# Patient Record
Sex: FEMALE | Race: WHITE | Employment: OTHER | URBAN - METROPOLITAN AREA
[De-identification: names, ages, dates, MRNs, and addresses within clinical notes are randomized per-mention and may not be internally consistent; named-entity substitution may affect disease eponyms.]

---

## 2017-01-01 ENCOUNTER — NURSING HOME VISIT (OUTPATIENT)
Dept: GERIATRICS | Facility: CLINIC | Age: 72
End: 2017-01-01
Payer: COMMERCIAL

## 2017-01-01 ENCOUNTER — TELEPHONE (OUTPATIENT)
Dept: GERIATRICS | Facility: CLINIC | Age: 72
End: 2017-01-01

## 2017-01-01 ENCOUNTER — HOSPITAL LABORATORY (OUTPATIENT)
Facility: OTHER | Age: 72
End: 2017-01-01

## 2017-01-01 ENCOUNTER — APPOINTMENT (OUTPATIENT)
Dept: GENERAL RADIOLOGY | Facility: CLINIC | Age: 72
End: 2017-01-01
Attending: EMERGENCY MEDICINE
Payer: MEDICARE

## 2017-01-01 ENCOUNTER — DOCUMENTATION ONLY (OUTPATIENT)
Dept: GERIATRICS | Facility: CLINIC | Age: 72
End: 2017-01-01

## 2017-01-01 ENCOUNTER — HOSPITAL ENCOUNTER (EMERGENCY)
Facility: CLINIC | Age: 72
Discharge: HOME | End: 2017-08-24
Attending: EMERGENCY MEDICINE | Admitting: EMERGENCY MEDICINE
Payer: MEDICARE

## 2017-01-01 VITALS
SYSTOLIC BLOOD PRESSURE: 122 MMHG | WEIGHT: 138.6 LBS | TEMPERATURE: 97.6 F | RESPIRATION RATE: 18 BRPM | BODY MASS INDEX: 23.06 KG/M2 | HEART RATE: 98 BPM | OXYGEN SATURATION: 93 % | DIASTOLIC BLOOD PRESSURE: 66 MMHG

## 2017-01-01 VITALS
BODY MASS INDEX: 21.83 KG/M2 | TEMPERATURE: 96 F | DIASTOLIC BLOOD PRESSURE: 58 MMHG | SYSTOLIC BLOOD PRESSURE: 112 MMHG | HEART RATE: 83 BPM | RESPIRATION RATE: 18 BRPM | WEIGHT: 131.2 LBS | OXYGEN SATURATION: 91 %

## 2017-01-01 VITALS
DIASTOLIC BLOOD PRESSURE: 56 MMHG | TEMPERATURE: 97.1 F | WEIGHT: 136.2 LBS | OXYGEN SATURATION: 95 % | BODY MASS INDEX: 22.66 KG/M2 | RESPIRATION RATE: 20 BRPM | HEART RATE: 95 BPM | SYSTOLIC BLOOD PRESSURE: 148 MMHG

## 2017-01-01 VITALS
SYSTOLIC BLOOD PRESSURE: 100 MMHG | RESPIRATION RATE: 18 BRPM | WEIGHT: 140 LBS | TEMPERATURE: 97.6 F | OXYGEN SATURATION: 96 % | HEART RATE: 74 BPM | BODY MASS INDEX: 23.3 KG/M2 | DIASTOLIC BLOOD PRESSURE: 56 MMHG

## 2017-01-01 VITALS
SYSTOLIC BLOOD PRESSURE: 124 MMHG | RESPIRATION RATE: 18 BRPM | HEART RATE: 94 BPM | BODY MASS INDEX: 23.63 KG/M2 | TEMPERATURE: 98.1 F | WEIGHT: 142 LBS | DIASTOLIC BLOOD PRESSURE: 68 MMHG

## 2017-01-01 VITALS
WEIGHT: 141 LBS | SYSTOLIC BLOOD PRESSURE: 132 MMHG | DIASTOLIC BLOOD PRESSURE: 62 MMHG | TEMPERATURE: 98.2 F | RESPIRATION RATE: 20 BRPM | BODY MASS INDEX: 23.46 KG/M2 | OXYGEN SATURATION: 92 % | HEART RATE: 95 BPM

## 2017-01-01 VITALS
DIASTOLIC BLOOD PRESSURE: 66 MMHG | HEART RATE: 85 BPM | BODY MASS INDEX: 21.97 KG/M2 | SYSTOLIC BLOOD PRESSURE: 142 MMHG | RESPIRATION RATE: 18 BRPM | TEMPERATURE: 97.4 F | WEIGHT: 132 LBS | OXYGEN SATURATION: 95 %

## 2017-01-01 VITALS
TEMPERATURE: 97.8 F | HEART RATE: 92 BPM | DIASTOLIC BLOOD PRESSURE: 62 MMHG | SYSTOLIC BLOOD PRESSURE: 132 MMHG | OXYGEN SATURATION: 94 % | RESPIRATION RATE: 20 BRPM

## 2017-01-01 VITALS
OXYGEN SATURATION: 85 % | RESPIRATION RATE: 30 BRPM | TEMPERATURE: 98 F | SYSTOLIC BLOOD PRESSURE: 129 MMHG | DIASTOLIC BLOOD PRESSURE: 65 MMHG | HEART RATE: 127 BPM

## 2017-01-01 VITALS
BODY MASS INDEX: 22.13 KG/M2 | OXYGEN SATURATION: 93 % | HEART RATE: 88 BPM | WEIGHT: 133 LBS | RESPIRATION RATE: 18 BRPM | TEMPERATURE: 94.6 F | SYSTOLIC BLOOD PRESSURE: 139 MMHG | DIASTOLIC BLOOD PRESSURE: 66 MMHG

## 2017-01-01 VITALS
HEART RATE: 110 BPM | DIASTOLIC BLOOD PRESSURE: 120 MMHG | OXYGEN SATURATION: 95 % | SYSTOLIC BLOOD PRESSURE: 136 MMHG | RESPIRATION RATE: 18 BRPM | TEMPERATURE: 97.7 F

## 2017-01-01 VITALS
RESPIRATION RATE: 18 BRPM | HEART RATE: 83 BPM | BODY MASS INDEX: 21.83 KG/M2 | SYSTOLIC BLOOD PRESSURE: 112 MMHG | DIASTOLIC BLOOD PRESSURE: 58 MMHG | WEIGHT: 131.2 LBS | TEMPERATURE: 96 F | OXYGEN SATURATION: 92 %

## 2017-01-01 VITALS — SYSTOLIC BLOOD PRESSURE: 99 MMHG | DIASTOLIC BLOOD PRESSURE: 37 MMHG | HEART RATE: 81 BPM

## 2017-01-01 VITALS
WEIGHT: 142.2 LBS | SYSTOLIC BLOOD PRESSURE: 132 MMHG | OXYGEN SATURATION: 95 % | TEMPERATURE: 97.6 F | HEART RATE: 82 BPM | BODY MASS INDEX: 23.66 KG/M2 | RESPIRATION RATE: 18 BRPM | DIASTOLIC BLOOD PRESSURE: 64 MMHG

## 2017-01-01 VITALS
WEIGHT: 140.2 LBS | OXYGEN SATURATION: 96 % | HEART RATE: 66 BPM | TEMPERATURE: 98.5 F | SYSTOLIC BLOOD PRESSURE: 98 MMHG | BODY MASS INDEX: 23.33 KG/M2 | RESPIRATION RATE: 18 BRPM | DIASTOLIC BLOOD PRESSURE: 42 MMHG

## 2017-01-01 VITALS
HEART RATE: 82 BPM | BODY MASS INDEX: 23.66 KG/M2 | DIASTOLIC BLOOD PRESSURE: 64 MMHG | TEMPERATURE: 97.6 F | RESPIRATION RATE: 18 BRPM | OXYGEN SATURATION: 95 % | SYSTOLIC BLOOD PRESSURE: 132 MMHG | WEIGHT: 142.2 LBS

## 2017-01-01 VITALS
HEART RATE: 88 BPM | DIASTOLIC BLOOD PRESSURE: 60 MMHG | WEIGHT: 142 LBS | SYSTOLIC BLOOD PRESSURE: 122 MMHG | BODY MASS INDEX: 23.63 KG/M2 | OXYGEN SATURATION: 96 %

## 2017-01-01 DIAGNOSIS — K56.0 PARALYTIC ILEUS (H): ICD-10-CM

## 2017-01-01 DIAGNOSIS — J44.9 CHRONIC OBSTRUCTIVE PULMONARY DISEASE, UNSPECIFIED COPD TYPE (H): ICD-10-CM

## 2017-01-01 DIAGNOSIS — F41.1 GAD (GENERALIZED ANXIETY DISORDER): ICD-10-CM

## 2017-01-01 DIAGNOSIS — Z93.1 FEEDING BY G-TUBE (H): ICD-10-CM

## 2017-01-01 DIAGNOSIS — K56.7 ILEUS (H): Primary | ICD-10-CM

## 2017-01-01 DIAGNOSIS — E51.2 WERNICKE ENCEPHALOPATHY: Primary | ICD-10-CM

## 2017-01-01 DIAGNOSIS — E51.2 WERNICKE ENCEPHALOPATHY: ICD-10-CM

## 2017-01-01 DIAGNOSIS — G62.9 NEUROPATHY: ICD-10-CM

## 2017-01-01 DIAGNOSIS — B30.9 VIRAL CONJUNCTIVITIS: Primary | ICD-10-CM

## 2017-01-01 DIAGNOSIS — R62.7 FAILURE TO THRIVE IN ADULT: ICD-10-CM

## 2017-01-01 DIAGNOSIS — K56.0 PARALYTIC ILEUS (H): Primary | ICD-10-CM

## 2017-01-01 DIAGNOSIS — B35.9 DERMATOPHYTOSIS: Primary | ICD-10-CM

## 2017-01-01 DIAGNOSIS — K59.01 SLOW TRANSIT CONSTIPATION: ICD-10-CM

## 2017-01-01 DIAGNOSIS — F41.8 DEPRESSION WITH ANXIETY: ICD-10-CM

## 2017-01-01 DIAGNOSIS — E46 PROTEIN-CALORIE MALNUTRITION (H): ICD-10-CM

## 2017-01-01 DIAGNOSIS — R62.7 FAILURE TO THRIVE IN ADULT: Primary | ICD-10-CM

## 2017-01-01 DIAGNOSIS — Z93.1 FEEDING BY G-TUBE (H): Primary | ICD-10-CM

## 2017-01-01 DIAGNOSIS — J44.9 CHRONIC OBSTRUCTIVE PULMONARY DISEASE, UNSPECIFIED COPD TYPE (H): Primary | ICD-10-CM

## 2017-01-01 DIAGNOSIS — F41.9 ANXIETY: ICD-10-CM

## 2017-01-01 DIAGNOSIS — F41.8 DEPRESSION WITH ANXIETY: Primary | ICD-10-CM

## 2017-01-01 DIAGNOSIS — T85.528A DISLODGED GASTROSTOMY TUBE: ICD-10-CM

## 2017-01-01 DIAGNOSIS — R23.8 SKIN BREAKDOWN: Primary | ICD-10-CM

## 2017-01-01 DIAGNOSIS — K56.7 ILEUS (H): ICD-10-CM

## 2017-01-01 DIAGNOSIS — F33.1 MAJOR DEPRESSIVE DISORDER, RECURRENT EPISODE, MODERATE (H): ICD-10-CM

## 2017-01-01 DIAGNOSIS — R62.7 FAILURE TO THRIVE IN ADULT: Chronic | ICD-10-CM

## 2017-01-01 DIAGNOSIS — M62.81 GENERALIZED MUSCLE WEAKNESS: ICD-10-CM

## 2017-01-01 LAB
ALBUMIN UR-MCNC: NEGATIVE MG/DL
ANION GAP SERPL CALCULATED.3IONS-SCNC: 7 MMOL/L (ref 3–14)
APPEARANCE UR: CLEAR
BACTERIA SPEC CULT: NO GROWTH
BASOPHILS # BLD AUTO: 0.1 10E9/L (ref 0–0.2)
BASOPHILS NFR BLD AUTO: 0.4 %
BILIRUB UR QL STRIP: NEGATIVE
BUN SERPL-MCNC: 28 MG/DL (ref 7–30)
CALCIUM SERPL-MCNC: 9.6 MG/DL (ref 8.5–10.1)
CHLORIDE SERPL-SCNC: 96 MMOL/L (ref 94–109)
CO2 SERPL-SCNC: 34 MMOL/L (ref 20–32)
COLOR UR AUTO: YELLOW
CREAT SERPL-MCNC: 0.88 MG/DL (ref 0.52–1.04)
DIFFERENTIAL METHOD BLD: ABNORMAL
EOSINOPHIL # BLD AUTO: 0.4 10E9/L (ref 0–0.7)
EOSINOPHIL NFR BLD AUTO: 3.6 %
ERYTHROCYTE [DISTWIDTH] IN BLOOD BY AUTOMATED COUNT: 13.3 % (ref 10–15)
GFR SERPL CREATININE-BSD FRML MDRD: 63 ML/MIN/1.7M2
GLUCOSE SERPL-MCNC: 108 MG/DL (ref 70–99)
GLUCOSE UR STRIP-MCNC: NEGATIVE MG/DL
HCT VFR BLD AUTO: 38.6 % (ref 35–47)
HGB BLD-MCNC: 12.7 G/DL (ref 11.7–15.7)
HGB UR QL STRIP: NEGATIVE
IMM GRANULOCYTES # BLD: 0 10E9/L (ref 0–0.4)
IMM GRANULOCYTES NFR BLD: 0.3 %
KETONES UR STRIP-MCNC: NEGATIVE MG/DL
LEUKOCYTE ESTERASE UR QL STRIP: NEGATIVE
LYMPHOCYTES # BLD AUTO: 1 10E9/L (ref 0.8–5.3)
LYMPHOCYTES NFR BLD AUTO: 7.8 %
MCH RBC QN AUTO: 31.7 PG (ref 26.5–33)
MCHC RBC AUTO-ENTMCNC: 32.9 G/DL (ref 31.5–36.5)
MCV RBC AUTO: 96 FL (ref 78–100)
MICRO REPORT STATUS: NORMAL
MONOCYTES # BLD AUTO: 1 10E9/L (ref 0–1.3)
MONOCYTES NFR BLD AUTO: 7.8 %
MUCOUS THREADS #/AREA URNS LPF: PRESENT /LPF
NEUTROPHILS # BLD AUTO: 9.9 10E9/L (ref 1.6–8.3)
NEUTROPHILS NFR BLD AUTO: 80.1 %
NITRATE UR QL: NEGATIVE
PH UR STRIP: 6 PH (ref 5–7)
PLATELET # BLD AUTO: 240 10E9/L (ref 150–450)
POTASSIUM SERPL-SCNC: 4.3 MMOL/L (ref 3.4–5.3)
RBC # BLD AUTO: 4.01 10E12/L (ref 3.8–5.2)
RBC #/AREA URNS AUTO: 1 /HPF (ref 0–2)
SODIUM SERPL-SCNC: 137 MMOL/L (ref 133–144)
SP GR UR STRIP: 1.01 (ref 1–1.03)
SPECIMEN SOURCE: NORMAL
SQUAMOUS #/AREA URNS AUTO: <1 /HPF (ref 0–1)
URN SPEC COLLECT METH UR: ABNORMAL
UROBILINOGEN UR STRIP-MCNC: NORMAL MG/DL (ref 0–2)
VIT B1 BLD-MCNC: 158 UG/DL
VIT B12 SERPL-MCNC: 1097 PG/ML (ref 193–986)
VIT B12 SERPL-MCNC: 1415 PG/ML (ref 193–986)
WBC # BLD AUTO: 12.4 10E9/L (ref 4–11)
WBC #/AREA URNS AUTO: 1 /HPF (ref 0–2)

## 2017-01-01 PROCEDURE — 99308 SBSQ NF CARE LOW MDM 20: CPT | Performed by: NURSE PRACTITIONER

## 2017-01-01 PROCEDURE — 99207 ZZC CDG-CORRECTLY CODED, REVIEWED AND AGREE: CPT | Performed by: FAMILY MEDICINE

## 2017-01-01 PROCEDURE — 99207 ZZC CDG-CORRECTLY CODED, REVIEWED AND AGREE: CPT | Performed by: NURSE PRACTITIONER

## 2017-01-01 PROCEDURE — 99310 SBSQ NF CARE HIGH MDM 45: CPT | Performed by: FAMILY MEDICINE

## 2017-01-01 PROCEDURE — 40000986 XR KUB

## 2017-01-01 PROCEDURE — 99309 SBSQ NF CARE MODERATE MDM 30: CPT | Performed by: NURSE PRACTITIONER

## 2017-01-01 PROCEDURE — 99284 EMERGENCY DEPT VISIT MOD MDM: CPT | Mod: 25 | Performed by: EMERGENCY MEDICINE

## 2017-01-01 PROCEDURE — 43760 ZZHC CHANGE GASTROSTOMY TUBE PERC, WO IMAGING OR ENDO GUIDE: CPT | Mod: Z6 | Performed by: EMERGENCY MEDICINE

## 2017-01-01 PROCEDURE — 43760 ZZHC CHANGE GASTROSTOMY TUBE PERC, WO IMAGING OR ENDO GUIDE: CPT | Performed by: EMERGENCY MEDICINE

## 2017-01-01 PROCEDURE — 99310 SBSQ NF CARE HIGH MDM 45: CPT | Performed by: NURSE PRACTITIONER

## 2017-01-01 RX ORDER — OLOPATADINE HYDROCHLORIDE 1 MG/ML
1 SOLUTION/ DROPS OPHTHALMIC 2 TIMES DAILY
COMMUNITY

## 2017-01-01 RX ORDER — ATROPINE SULFATE 10 MG/ML
4 SOLUTION/ DROPS OPHTHALMIC EVERY 4 HOURS PRN
COMMUNITY

## 2017-01-01 RX ORDER — IPRATROPIUM BROMIDE AND ALBUTEROL SULFATE 2.5; .5 MG/3ML; MG/3ML
1 SOLUTION RESPIRATORY (INHALATION) 4 TIMES DAILY
COMMUNITY
End: 2017-01-01

## 2017-01-01 RX ORDER — BISACODYL 10 MG
10 SUPPOSITORY, RECTAL RECTAL DAILY
COMMUNITY

## 2017-01-03 NOTE — PROGRESS NOTES
[place] [facility]    Chief Complaint   Patient presents with     Nursing Home Acute       HPI:    Arlin Gonzalez is a 71 year old  (1945), who is being seen today for an episodic care visit at [facility]. Today's concern is:  Dermatophytosis  Has upcoming appointment to see infectious disease re rash on back, family is questioning whether she needs to go or not. Nursing notes rash is improved, no open areas on her back at this time.       REVIEW OF SYSTEMS:  4 point ROS including Respiratory, CV, GI and , other than that noted in the HPI,  is negative    GENERAL APPEARANCE:  Alert, in no acute distress-ongoing anxiety at baseline   CHEST/RESP:  respiratory effort normal , no respiratory distress  CV: no peripheral edema   SKIN: mild ruddiness on most of back and down to buttocks, no obvious rash/redness, no warmth, no open areas observed  PSYCH:  Alert and oriented to self and spouse , affect anxious, judgement impaired by cognitive losses.     Assessment:  Dermatophytosis  Resolved at this point with remaining ruddiness without symptoms infection. Has had x4 weeks of antibiotics, tolerated. Likely no reason for follow up with infectious disease at this time.       Plan:  1.  Ok to DC Infectious Disease consult- rash resolved-has completed x4 wks of abx.      Time 15 minutes for patient visit and review of medical records    Electronically signed by  Radha Salazar CNP   [facility]  [phone] cell

## 2017-01-10 NOTE — PROGRESS NOTES
"  [place] [facility]    Chief Complaint   Patient presents with     senior care Regulatory       HPI:    Arlin Gonzalez is a 71 year old  (1945), who is being seen today for a federally mandated E/M visit at [facility] . Today's concerns are:  Chronic obstructive pulmonary disease, unspecified COPD type (H)  -  No cough, wheezing, or SOB reported. Does not use O2      Anxiety  - very anxious most of the time  - no concern at this time.     Wernicke encephalopathy  - Reported to be 2/2 to vitamin deficiency.    - \"Continues to confabulate stories regularly and this is sometimes distressing to the resident\"    Dermatitis:  - Treated with ABX resulted in resolution of the rash.     Protein-calorie malnutrition (H) and Feeding by G-tube (H)  - Chronic, has a gastric feeding tube and receives a bolus nutrition supplement.     ALLERGIES: Penicillins; Adhesive tape; Amoxicillin; Ceftin; Cefzil; Ciprofloxacin; Doxycycline; Erythromycin; Iodine; Ivp dye; Levofloxacin; Macrobid; Metronidazole; Milk protein extract; Naprosyn; Prednisone; Shellfish allergy; Wheat bran; and Triamcinolone  PAST MEDICAL HISTORY:  has a past medical history of Chronic airway obstruction, not elsewhere classified; Endometriosis of other specified sites; Histoplasmosis; Kidney stone; Colitis (11/17/2014); Skin cancer; and Tobacco abuse (10/16/2012). She also has no past medical history of Squamous cell carcinoma (H), Malignant melanoma (H), or Basal cell carcinoma.  PAST SURGICAL HISTORY:  has past surgical history that includes cholecystectomy, laporoscopic (); breast lumpectomy, rt/lt (); wedge resection (); breast biopsy, rt/lt (11/21/2008); mastectomy, mod radical (any) (12/30/2008); hysterectomy, pap no longer indicated; Mastectomy modified radical bilateral (3/31/2010); hysterectomy, maria c; Revise reconstructed breast bilateral (10/16/2012); Esophagoscopy, gastroscopy, duodenoscopy (EGD), " combined (N/A, 9/25/2014); Esophagoscopy, gastroscopy, duodenoscopy (EGD), combined (N/A, 12/3/2014); picc insertion (Right, 12/4/2014); Esophageal motility study (12/8/14); and UGI ENDOSCOPY W PLACEMENT GASTROSTOMY TUBE PERCUT (N/A, 3/31/2016).  FAMILY HISTORY: family history includes Blood Disease in her mother; Cardiovascular in her brother; DIABETES in her father and paternal grandmother; HEART DISEASE in her brother, father, mother, and paternal grandfather; Hypertension in her brother and paternal grandfather.  SOCIAL HISTORY:  reports that she quit smoking about 2 years ago. Her smoking use included Cigarettes. She has a 20 pack-year smoking history. She has never used smokeless tobacco. She reports that she does not drink alcohol or use illicit drugs.    MEDICATIONS:  Current Outpatient Prescriptions   Medication Sig Dispense Refill     mineral oil-hydrophilic petrolatum (AQUAPHOR) Apply topically daily Apply to back       LACTOBACILLUS PO Take 1 billion CFU PO daily       EPINEPHrine 0.3 MG/0.3ML injection 2-pack Inject 0.3 mg into the muscle as needed for anaphylaxis       acetaminophen (TYLENOL) 500 MG tablet Take 2 tablets (1,000 mg) by mouth 3 times daily (Patient taking differently: Take 1,000 mg by mouth 3 times daily And 650mg by mouth every 4hrs PRN for pain) 100 tablet 0     nystatin POWD Externally apply topically 2 times daily       PROPRANOLOL HCL PO Take 10 mg by mouth 3 times daily        LORAZEPAM PO Take 2 mg by mouth 2 times daily as needed        LORAZEPAM PO Take 2 mg by mouth every 8 hours        Nutritional Supplements (ISOSOURCE 1.5 SRINIVAS) LIQD 1 Can by Enteral route 5 times daily        cyanocobalamin (VITAMIN B12) 1000 MCG/ML injection Inject 1 mL into the muscle every 28 days        Lansoprazole (PREVACID PO) Take 30 mg by mouth 2 times daily       Medications reviewed:    Case Management:  I have reviewed the care plan and MDS and do agree with the plan. Patient's desire to return to  the community is not present.  Information reviewed:  Medications, vital signs, orders, and nursing notes.    ROS:  10 point ROS of systems including Constitutional, Eyes, Respiratory, Cardiovascular, Gastroenterology, Genitourinary, Integumentary, Muscularskeletal, Psychiatric were all negative except for pertinent positives noted in my HPI.    Exam:  /56 mmHg  Pulse 74  Temp(Src) 97.6  F (36.4  C)  Resp 18  Wt 140 lb (63.504 kg)  SpO2 96%  LMP 01/01/1982  GENERAL APPEARANCE:  Alert, anxious, cooperative  ENT:  Mouth and posterior oropharynx normal, moist mucous membranes, implanted teeth  EYES:  EOM, conjunctivae, lids, pupils and irises normal  NECK:  No adenopathy,masses or thyromegaly  RESP:  respiratory effort and palpation of chest normal, no respiratory distress,   CV:  Palpation and auscultation of heart done , regular rate and rhythm, no murmur, rub, or gallop, no edema  ABDOMEN:  normal bowel sounds, soft, nontender, no hepatosplenomegaly or other masses, G tube in place.    M/S:   Gait and station abnormal bed fast and wc bound, proximal muscle weakness.  SKIN:  no rash or skin lesion noted. Absent breast b/l.   NEURO:   Cranial nerves 2-12 are normal tested and grossly at patient's baseline, no purposeful movement in upper and lower extremities  PSYCH:  oriented to name and place. Anxious mood and affect. Judgement and memory impaired.     Lab/Diagnostic data:  All labs reviewed, none recent    ASSESSMENT/PLAN  Chronic obstructive pulmonary disease, unspecified COPD type (H)  - chronic, stable, not on meds.     Anxiety  - Chronic, stable with lorazepam.   - Consider starting Buspar with Lorazepam GDR.   - On Propranolol.     Wernicke encephalopathy  - chronic and stable. On vitamins supplement  - Continue to anticipate needs. Chronic condition, ongoing decline expected.   -  Continue to provide redirection and reassurance as needed. Maintain safe living situation with goals focused on  comfort.      AFTT:  - Significant  Deficits requiring NH placement  - Requiring extensive assistance from nursing  - Up for meals only o/w spends the day resting in bed    Dermatophytosis / Multi-bacterial skin infection:  - Completed ABX, resolved.   - Has EpiPen for emergency.  - [facility] offered the patient penicillin testing with subsequent amoxicillin challenge, which Resident absolutely refused. Concerned that not much could  Be offered from Derma's point of view at this time.     RECOMMENDATIONS:  - Consider starting Buspar with Lorazepam GDR.     Orders:  - The current medical regimen is effective;  continue present plan and medications.      Total time spent with patient visit was 35 min including patient visit and review of past records.    Electronically signed by:  Vinicius Moreno MD

## 2017-01-27 PROBLEM — K56.0 PARALYTIC ILEUS (H): Status: ACTIVE | Noted: 2017-01-01

## 2017-01-27 NOTE — PROGRESS NOTES
[place] [facility]    Chief Complaint   Patient presents with     Nursing Home Acute       HPI:    Arlin Gonzalez is a 71 year old  (1945), who is being seen today for an episodic care visit at [facility] . Today's concern is:  Paralytic ileus (H)  Yesterday was noted to have sudden onset of increased abdominal pain, distension. BMs have been regular-no BM on 1/25 but both 1/24 and 1/23 BM were normal-no diarrhea, continues with Gtube feeding 250 ml 5 x per day. Flat Plate abdomen Xray on 1/25/16 noted mild colonic dilatation consistent with ileus. She was placed NPO-has had no meds and needs evaluation today for ongoing orders. She had a suppository yesterday with small amount of results.  Today she is reportedly feeling well, has had some clear liquids with no N/V, no further stools. She is not complaining of pain and is quite calm.      ALLERGIES: Penicillins; Adhesive tape; Amoxicillin; Ceftin; Cefzil; Ciprofloxacin; Doxycycline; Erythromycin; Iodine; Ivp dye; Levofloxacin; Macrobid; Metronidazole; Milk protein extract; Naprosyn; Prednisone; Shellfish allergy; Wheat bran; and Triamcinolone  Past Medical, Surgical, Family and Social History reviewed and updated in [facility].    Current Outpatient Prescriptions   Medication Sig Dispense Refill     mineral oil-hydrophilic petrolatum (AQUAPHOR) Apply topically daily Apply to back       LACTOBACILLUS PO Take 1 billion CFU PO daily       EPINEPHrine 0.3 MG/0.3ML injection 2-pack Inject 0.3 mg into the muscle as needed for anaphylaxis       acetaminophen (TYLENOL) 500 MG tablet Take 2 tablets (1,000 mg) by mouth 3 times daily (Patient taking differently: Take 1,000 mg by mouth 3 times daily And 650mg by mouth every 4hrs PRN for pain) 100 tablet 0     nystatin POWD Externally apply topically 2 times daily       PROPRANOLOL HCL PO Take 10 mg by mouth 3 times daily        LORAZEPAM PO Take 2 mg by mouth 2 times daily as needed        LORAZEPAM PO  Take 2 mg by mouth every 8 hours        Nutritional Supplements (ISOSOURCE 1.5 SRINIVAS) LIQD 1 Can by Enteral route 5 times daily        cyanocobalamin (VITAMIN B12) 1000 MCG/ML injection Inject 1 mL into the muscle every 28 days        Lansoprazole (PREVACID PO) Take 30 mg by mouth 2 times daily         Medications reconciled to facility chart and changes were made to reflect current medications as identified as above med list. Below are the changes that were made:   Medications stopped since last EPIC medication reconciliation:   There are no discontinued medications.    Medications started since last [facility] medication reconciliation:  No orders of the defined types were placed in this encounter.         REVIEW OF SYSTEMS:  Anamika complains of mild pain in the abdomen, janis with palpation. She is not uncomfortable at rest and she is sleeping easily when I first entered the room. She is alert, able to answer simple questions. She confabulates stories (which is her baseline) and today notes that she thinks she had abdominal surgery yesterday. She offers no other concerns.     PHYSICAL EXAM:  /60 mmHg  Pulse 88  Wt 142 lb (64.411 kg)  SpO2 96%  LMP 01/01/1982    GENERAL APPEARANCE:  Alert, in no acute distress  HEAD:  Normal, normocephalic, atraumatic  EYE EXAM: normal external eye, conjunctiva, lids, ANNETTE  NECK EXAM: stiff, no JVD  CHEST/RESP:  respiratory effort normal, lung sounds CTA , no respiratory distress  CV:  Rate reg, rhythm reg, no murmur, no peripheral edema   GI/ABDOMEN:  Slightly hyperactive, but normal bowel sounds x 4, firm, slightly tender to touch throughout but more so in the R LQ, no palpable masses, Gtube site CDI, looks good  M/S:   extremities normal, gait abnormal-she does not ambulate, normal muscle tone, and range of motion at baseline   SKIN EXAM: CDI, no open areas, no rash  NEUROLOGIC EXAM: Normal gross motor movement, tone and coordination. No tremor.  Cranial nerves 2-12 are normal  tested and grossly at patient's baseline  PSYCH:  Alert and oriented to self, affect anxious at baseline , judgement impaired by cognitive losses     RECENT LABS:  Reviewed in facility records     ASSESSMENT/PLAN:  Paralytic ileus (H)  She is feeling better than yesterday. Has remained NPO and no GTube feedings, including no meds x 24 hours. Clear liquids started a few hours ago, and she has tolerated these well without N/V.    ORDERS:  Repeat dulc supp now  Will get second flat plate abd tonight after supp has worked  Remain no Gtube feedings, no po meds  OK for clear liquids.   Monitor closely    1/27/2017 addendum  No further BM, no pain, no anxiety. She is actually doing quite well, per nursing report. Remarkably, she is more relaxed and more alert than is usual (and she has had NO meds for 48 hours!) She is taking fluids po. Will continue with bowel rest, the best treatment for ileus, as long as she is not uncomfortable. We will resume gtube flushes at a reduced rate today to keep gtube open and provide some extra hydration (100 ml 5x per day). In two days, unless she develops more pain, will start very small tube feedings at 50 ml 5x per day with 100 ml flushes. Will plan to reevaluate on Monday.     Total time spent with patient visit was 25 min including patient visit and review of past records   Greater than 50% of total time spent with counseling and coordinating care    Electronically signed by  Radha Salazar CNP   [facility]  [phone] cell

## 2017-01-31 NOTE — PROGRESS NOTES
[place] [facility]    Chief Complaint   Patient presents with     Nursing Home Acute       HPI:    Arlin Gonzalez is a 71 year old  (1945), who is being seen today for an episodic care visit at [facility] . Today's concern is:  Ileus (H)  No nausea, has tolerated small feedings last few days. No BM yet today, but did have Medium yesterday and diarrhea stool on the 29th. She often has diarrhea. Continues to complain of pain in the abdomen, appears unchanged from 5 days ago. She is not nauseated and has not vomited, per nursing staff. It is noted that most of her po meds have been on hold for several days and she is feeling better.        ALLERGIES: Penicillins; Adhesive tape; Amoxicillin; Ceftin; Cefzil; Ciprofloxacin; Doxycycline; Erythromycin; Iodine; Ivp dye; Levofloxacin; Macrobid; Metronidazole; Milk protein extract; Naprosyn; Prednisone; Shellfish allergy; Wheat bran; and Triamcinolone  Past Medical, Surgical, Family and Social History reviewed and updated in [facility].    Current Outpatient Prescriptions   Medication Sig Dispense Refill     ACETAMINOPHEN RE Place 650 mg rectally every 6 hours as needed for mild pain or fever       ACETAMINOPHEN PO Take 650 mg by mouth every 6 hours as needed for pain       mineral oil-hydrophilic petrolatum (AQUAPHOR) Apply topically daily Apply to back       LACTOBACILLUS PO 3 times daily        EPINEPHrine 0.3 MG/0.3ML injection 2-pack Inject 0.3 mg into the muscle as needed for anaphylaxis       nystatin POWD Externally apply topically 2 times daily       LORAZEPAM PO Take 2 mg by mouth 2 times daily as needed        Nutritional Supplements (ISOSOURCE 1.5 SRINIVAS) LIQD 1 Can by Enteral route 5 times daily        cyanocobalamin (VITAMIN B12) 1000 MCG/ML injection Inject 1 mL into the muscle every 28 days          Medications reconciled to facility chart and changes were made to reflect current medications as identified as above med list. Below are the  changes that were made:   Medications stopped since last EPIC medication reconciliation:   Medications Discontinued During This Encounter   Medication Reason     LORAZEPAM PO      PROPRANOLOL HCL PO      acetaminophen (TYLENOL) 500 MG tablet      Lansoprazole (PREVACID PO)        Medications started since last [facility] medication reconciliation:  Orders Placed This Encounter   Medications     ACETAMINOPHEN RE     Sig: Place 650 mg rectally every 6 hours as needed for mild pain or fever     ACETAMINOPHEN PO     Sig: Take 650 mg by mouth every 6 hours as needed for pain       REVIEW OF SYSTEMS:  Unobtainable secondary to cognitive impairment or aphasia, but today pt reports no new concerns.     PHYSICAL EXAM:  /64 mmHg  Pulse 82  Temp(Src) 97.6  F (36.4  C)  Resp 18  Wt 142 lb 3.2 oz (64.501 kg)  SpO2 95%  LMP 01/01/1982  GENERAL APPEARANCE:  Alert, in no acute distress-appears anxious as at baseline .   HEAD:  Normal, normocephalic, atraumatic  EYE EXAM: normal external eye, conjunctiva, lids, ANNETTE  NECK EXAM: stiff, no JVD  CHEST/RESP:  respiratory effort normal, lung sounds CTA , no respiratory distress  CV:  Rate reg, rhythm reg, no murmur, no peripheral edema   GI/ABDOMEN:  normal bowel sounds, soft, tender, no palpable masses  M/S:   extremities abnormal, gait abnormal-does not ambulate, normal muscle tone, and range of motion normal  NEUROLOGIC EXAM: Normal gross motor movement, tone and coordination. No tremor.  Cranial nerves 2-12 are normal tested and grossly at patient's baseline  PSYCH:  Alert and oriented to self, affect anxious, judgement impaired.     RECENT LABS:    Reviewed in facility records     ASSESSMENT/PLAN:  Ileus (H)  Has tolerated small feedings with no adverse effects, has had BM. Pain stable. Will gradually increase TF and regular diet. She has tolerated without meds below for several days. See med changes planned. Dose reduction likely helpful.       ORDERS:  1.  Ok to advance to  regular diet as tolerated.  2.  Increase feeding to 100cc 5x/day x2 days, then resume 240cc (1 can) 5x/day.  3.  Increase water flushes to 150cc water before and after feedings.  4.  DC Propranolol, scheduled Lorazepam, scheduled Tylenol PO, and Lansoprazole.  5.  Tylenol 650mg PO every 6hrs PRN for pain--or may use Tylenol suppository.  6.  May resume Lactobacillus TID.      Total time spent with patient visit was 35 min including patient visit and review of past records   Greater than 50% of total time spent with counseling and coordinating care    Electronically signed by  Radha Salazar CNP   [facility]  [phone] cell

## 2017-02-06 NOTE — PROGRESS NOTES
[place] [facility]    Chief Complaint   Patient presents with     Nursing Home Acute       HPI:    Arlin Gonzalez is a 71 year old  (1945), who is being seen today for an episodic care visit at [facility] . Today's concern is:  1. Paralytic ileus (H)    2. Chronic obstructive pulmonary disease, unspecified COPD type (H)    3. Feeding by G-tube (H)    4. Wernicke encephalopathy      After x-rays of abdomin were completed, it was found she has a paralytic ileus again.  She has been vomiting and this am he vomited her entire liquid feeding.  No evidence of a aspiration.  He GT feedings were held and we had he get only 60 cc's q 4 hours to keep it patient for the next 24 hours.  Noted that she has her Reglan dose changed.  The staff is unable to tell me when her last BM was.   was contacted once he arrived.  He wishes to remain with the DNR-Comfort care orders.  He does not wish her to be transferred to hospital.  He stated that if she is not able to resolve the ileus her at the facility, he would then prefer Hospice services.   She is anxious and does refuse to get up and out of bed.     ALLERGIES: Penicillins; Adhesive tape; Amoxicillin; Ceftin; Cefzil; Ciprofloxacin; Doxycycline; Erythromycin; Iodine; Ivp dye; Levofloxacin; Macrobid; Metronidazole; Milk protein extract; Naprosyn; Prednisone; Shellfish allergy; Wheat bran; and Triamcinolone  Past Medical, Surgical, Family and Social History reviewed and updated in [facility].    Current Outpatient Prescriptions   Medication Sig Dispense Refill     ACETAMINOPHEN RE Place 650 mg rectally every 6 hours as needed for mild pain or fever       ACETAMINOPHEN PO Take 650 mg by mouth every 6 hours as needed for pain       mineral oil-hydrophilic petrolatum (AQUAPHOR) Apply topically daily Apply to back       LACTOBACILLUS PO 3 times daily        EPINEPHrine 0.3 MG/0.3ML injection 2-pack Inject 0.3 mg into the muscle as needed for anaphylaxis        nystatin POWD Externally apply topically 2 times daily       LORAZEPAM PO Take 2 mg by mouth 2 times daily as needed        Nutritional Supplements (ISOSOURCE 1.5 SRINIVAS) LIQD 1 Can by Enteral route 5 times daily        cyanocobalamin (VITAMIN B12) 1000 MCG/ML injection Inject 1 mL into the muscle every 28 days        Medications reviewed:  Medications reconciled to facility chart and changes were made to reflect current medications as identified as above med list. Below are the changes that were made:   Medications stopped since last EPIC medication reconciliation:   There are no discontinued medications.    Medications started since last [facility] medication reconciliation:  No orders of the defined types were placed in this encounter.     REVIEW OF SYSTEMS:  Unobtainable secondary to cognitive impairment or aphasia.    Physical Exam:  /64 mmHg  Pulse 82  Temp(Src) 97.6  F (36.4  C)  Resp 18  Wt 142 lb 3.2 oz (64.501 kg)  SpO2 95%  LMP 01/01/1982  GENERAL APPEARANCE:  Alert, anxious  ENT:  normal hearing acuity, oral mucous membranes moist  NECK:  no adenopathy, no thyromegaly  RESP:  lungs clear to auscultation , no respiratory distress, expiratory wheezes  CV:  no edema, rate-normal  ABDOMEN:  bowel sounds normal, tender abdomen mild discomfort, slightly distension  M/S:   Gait and station abnormal she uses a wheelchair for all mobility needs at this time.  Digits and nails normal  SKIN:  Inspection of skin and subcutaneous tissue baseline, Palpation of skin and subcutaneous tissue baseline  NEURO:   Cranial nerves 2-12 are normal tested and grossly at patient's baseline, Examination of sensation by touch normal  PSYCH:  insight and judgement impaired, memory impaired     Recent Labs:    Hospital Laboratory on 02/03/2017   Component Date Value Ref Range Status     Specimen Description 02/03/2017 Unspecified Urine   Final     Culture Micro 02/03/2017 No growth   Final     Micro Report Status 02/03/2017 FINAL  02/05/2017   Final         Assessment/Plan:  1. Paralytic ileus (H)    2. Chronic obstructive pulmonary disease, unspecified COPD type (H)    3. Feeding by G-tube (H)    4. Wernicke encephalopathy      Orders:  1.  X-ray of Chest, abdomen.  Obtained at U/A with conditional U/C  2.  Keep her NPO for 24 hours then start feeding at 1/2 volume  3.  Restart Reglan 10 mg po TID  4.  Compazine Suppository 25 mg po BID for three days.      Electronically signed by  Tosin Soriano CNP

## 2017-02-11 NOTE — PROGRESS NOTES
[place] [facility]    Chief Complaint   Patient presents with     Nursing Home Acute       HPI:    Arlin Gonzalez is a 71 year old  (1945), who is being seen today for an episodic care visit at [facility] . Today's concern is:  1. Paralytic ileus (H)      The patient has had a struggle with an ileus this week.  She has had a BM, her Reglan was restarted, she was given an enema.  She is now able to take her whole feeding again but did stress to nursing that if she does have a BM every other day she will need to have the staff begin her bowel regimen.      ALLERGIES: Penicillins; Adhesive tape; Amoxicillin; Ceftin; Cefzil; Ciprofloxacin; Doxycycline; Erythromycin; Iodine; Ivp dye; Levofloxacin; Macrobid; Metronidazole; Milk protein extract; Naprosyn; Prednisone; Shellfish allergy; Wheat bran; and Triamcinolone  Past Medical, Surgical, Family and Social History reviewed and updated in [facility].    Current Outpatient Prescriptions   Medication Sig Dispense Refill     ACETAMINOPHEN RE Place 650 mg rectally every 6 hours as needed for mild pain or fever       ACETAMINOPHEN PO Take 650 mg by mouth every 6 hours as needed for pain       mineral oil-hydrophilic petrolatum (AQUAPHOR) Apply topically daily Apply to back       LACTOBACILLUS PO 3 times daily        EPINEPHrine 0.3 MG/0.3ML injection 2-pack Inject 0.3 mg into the muscle as needed for anaphylaxis       nystatin POWD Externally apply topically 2 times daily       LORAZEPAM PO Take 2 mg by mouth 2 times daily as needed        Nutritional Supplements (ISOSOURCE 1.5 SRINIVAS) LIQD 1 Can by Enteral route 5 times daily        cyanocobalamin (VITAMIN B12) 1000 MCG/ML injection Inject 1 mL into the muscle every 28 days        Medications reviewed:  Medications reconciled to facility chart and changes were made to reflect current medications as identified as above med list. Below are the changes that were made:   Medications stopped since last EPIC  medication reconciliation:   There are no discontinued medications.    Medications started since last [facility] medication reconciliation:  No orders of the defined types were placed in this encounter.     REVIEW OF SYSTEMS:  4 point ROS including Respiratory, CV, GI and , other than that noted in the HPI,  is negative    Physical Exam:  [facility] 01/01/1982  GENERAL APPEARANCE:  Alert, in no distress  RESP:  lungs clear to auscultation , no respiratory distress  CV:  no edema  ABDOMEN:  She has bowel sounds in all quads.  There is some tenderness over the entire abdomen.  She may need additional bowel medications  to keep her  bowel moving.  M/S:   Gait and station abnormal she is none ambulatory, uses a wheelchair for all needs.  Digits and nails normal  SKIN:  Inspection of skin and subcutaneous tissue baseline, Palpation of skin and subcutaneous tissue baseline  PSYCH:  insight and judgement impaired, memory impaired     Recent Labs:  Were reviewed today from the NH chart.    Assessment/Plan:  1. Paralytic ileus (H)      Orders:  1.  Continue with current plan of care.    Electronically signed by  LYLE Roger CNP

## 2017-02-14 NOTE — PROGRESS NOTES
[facility] [facility]    Chief Complaint   Patient presents with     Nursing Home Acute       HPI:    Arlin Gonzalez is a 71 year old  (1945), who is being seen today for an episodic care visit at [facility] . Today's concern is:  Paralytic ileus (H)  Constipation  Arlin complains of pain with tube feeding today. Had BM yesterday after suppository. On reglan. She is continuing to have periodic complaints of pain/bloating.        ALLERGIES: Penicillins; Adhesive tape; Amoxicillin; Ceftin; Cefzil; Ciprofloxacin; Doxycycline; Erythromycin; Iodine; Ivp dye [contrast dye]; Levofloxacin; Macrobid [nitrofurantoin]; Metronidazole; Milk protein extract; Naprosyn [naproxen]; Prednisone; Shellfish allergy; Wheat bran; and Triamcinolone  Past Medical, Surgical, Family and Social History reviewed and updated in [facility].    Current Outpatient Prescriptions   Medication Sig Dispense Refill     ACETAMINOPHEN RE Place 650 mg rectally every 6 hours as needed for mild pain or fever       ACETAMINOPHEN PO Take 650 mg by mouth every 6 hours as needed for pain       mineral oil-hydrophilic petrolatum (AQUAPHOR) Apply topically daily Apply to back       LACTOBACILLUS PO 3 times daily        EPINEPHrine 0.3 MG/0.3ML injection 2-pack Inject 0.3 mg into the muscle as needed for anaphylaxis       nystatin POWD Externally apply topically 2 times daily       LORAZEPAM PO Take 2 mg by mouth 2 times daily as needed        Nutritional Supplements (ISOSOURCE 1.5 SRINIVAS) LIQD 1 Can by Enteral route 5 times daily        cyanocobalamin (VITAMIN B12) 1000 MCG/ML injection Inject 1 mL into the muscle every 28 days          Medications reconciled to facility chart and changes were made to reflect current medications as identified as above med list. Below are the changes that were made:   Medications stopped since last EPIC medication reconciliation:   There are no discontinued medications.    Medications started since last EPIC  medication reconciliation:  No orders of the defined types were placed in this encounter.           REVIEW OF SYSTEMS:  4 point ROS including Respiratory, CV, GI and , other than that noted in the HPI,  is negative    PHYSICAL EXAM:  /62  Pulse 95  Temp 98.2  F (36.8  C)  Resp 20  Wt 141 lb (64 kg)  LMP 01/01/1982  SpO2 92%  BMI 23.46 kg/m2  GENERAL APPEARANCE:  Alert, in moderate distress  HEAD:  Normal, normocephalic, atraumatic  EYE EXAM: normal external eye, conjunctiva, lids, ANNETTE  NECK EXAM: stiff, no JVD  CHEST/RESP:  respiratory effort normal, lung sounds CTA , no respiratory distress  CV:  Rate reg, rhythm reg, no murmur, no peripheral edema   GI/ABDOMEN:  decreased bowel sounds, firm, tender to touch, no palpable masses  M/S:   extremities abnormal, gait abnormal-does not ambulate, normal muscle tone, and range of motion normal  NEUROLOGIC EXAM: Normal gross motor movement, tone and coordination. No tremor.  Cranial nerves 2-12 are normal tested and grossly at patient's baseline  PSYCH:  Alert and oriented to self and family with delusional behaviors, affect anxious, judgement impaired by cognitive losses     RECENT LABS:    Results for orders placed or performed in visit on 02/09/17   Basic metabolic panel   Result Value Ref Range    Sodium 137 133 - 144 mmol/L    Potassium 4.3 3.4 - 5.3 mmol/L    Chloride 96 94 - 109 mmol/L    Carbon Dioxide 34 (H) 20 - 32 mmol/L    Anion Gap 7 3 - 14 mmol/L    Glucose 108 (H) 70 - 99 mg/dL    Urea Nitrogen 28 7 - 30 mg/dL    Creatinine 0.88 0.52 - 1.04 mg/dL    GFR Estimate 63 >60 mL/min/1.7m2    GFR Estimate If Black 77 >60 mL/min/1.7m2    Calcium 9.6 8.5 - 10.1 mg/dL   CBC with platelets differential   Result Value Ref Range    WBC 12.4 (H) 4.0 - 11.0 10e9/L    RBC Count 4.01 3.8 - 5.2 10e12/L    Hemoglobin 12.7 11.7 - 15.7 g/dL    Hematocrit 38.6 35.0 - 47.0 %    MCV 96 78 - 100 fl    MCH 31.7 26.5 - 33.0 pg    MCHC 32.9 31.5 - 36.5 g/dL    RDW 13.3 10.0 -  15.0 %    Platelet Count 240 150 - 450 10e9/L    Diff Method Automated Method     % Neutrophils 80.1 %    % Lymphocytes 7.8 %    % Monocytes 7.8 %    % Eosinophils 3.6 %    % Basophils 0.4 %    % Immature Granulocytes 0.3 %    Absolute Neutrophil 9.9 (H) 1.6 - 8.3 10e9/L    Absolute Lymphocytes 1.0 0.8 - 5.3 10e9/L    Absolute Monocytes 1.0 0.0 - 1.3 10e9/L    Absolute Eosinophils 0.4 0.0 - 0.7 10e9/L    Absolute Basophils 0.1 0.0 - 0.2 10e9/L    Abs Immature Granulocytes 0.0 0 - 0.4 10e9/L   Vitamin B12   Result Value Ref Range    Vitamin B12 1415 (H) 193 - 986 pg/mL   Vitamin B1 whole blood   Result Value Ref Range    Vitamin B1 Whole Blood Level 158          ASSESSMENT/PLAN:  Paralytic ileus (H)  Constipation  Refused meds this am, but did take them later today. She tolerated her tube feeding at noon. Had BM yesterday after supp. Will continue to monitor very closely, watching BMs and pain.       ORDERS:  1. DC vit b12 injection  2. Recheck vit b12 level in 2 months on a thursday    Total time spent with patient visit was 25 min including patient visit and review of past records   Greater than 50% of total time spent with counseling and coordinating care    Electronically signed by  Radha Salazar CNP   [facility]  [phone] cell

## 2017-02-15 PROBLEM — K59.01 SLOW TRANSIT CONSTIPATION: Status: ACTIVE | Noted: 2017-01-01

## 2017-02-15 NOTE — PROGRESS NOTES
[place] [facility]    Chief Complaint   Patient presents with     Nursing Home Acute       HPI:    Arlin Gonzalez is a 71 year old  (1945), who is being seen today for an episodic care visit at [facility] . Today's concern is:  1. Paralytic ileus (H)    2. Chronic obstructive pulmonary disease, unspecified COPD type (H)    3. Anxiety    4. Wernicke encephalopathy      She is having some bowel movements but also continues to have poor bowel sounds and some generalized abdominal discomfort.  She is not vomiting, will spit up some phlegm at times.  Able to take her full volume feedings at this time.  She denies any issues with breathing and does look to be breathing comfortably.  She has some anxiety when speaking about her bowels and generalized condition.  She is able to admit she does feel anxious.  Her memory, judgement and mood are unchanged.     ALLERGIES: Penicillins; Adhesive tape; Amoxicillin; Ceftin; Cefzil; Ciprofloxacin; Doxycycline; Erythromycin; Iodine; Ivp dye [contrast dye]; Levofloxacin; Macrobid [nitrofurantoin]; Metronidazole; Milk protein extract; Naprosyn [naproxen]; Prednisone; Shellfish allergy; Wheat bran; and Triamcinolone  Past Medical, Surgical, Family and Social History reviewed and updated in [facility].    Current Outpatient Prescriptions   Medication Sig Dispense Refill     ACETAMINOPHEN RE Place 650 mg rectally every 6 hours as needed for mild pain or fever       ACETAMINOPHEN PO Take 650 mg by mouth every 6 hours as needed for pain       mineral oil-hydrophilic petrolatum (AQUAPHOR) Apply topically daily Apply to back       LACTOBACILLUS PO 3 times daily        EPINEPHrine 0.3 MG/0.3ML injection 2-pack Inject 0.3 mg into the muscle as needed for anaphylaxis       nystatin POWD Externally apply topically 2 times daily       LORAZEPAM PO Take 2 mg by mouth 2 times daily as needed        Nutritional Supplements (ISOSOURCE 1.5 SRINIVAS) LIQD 1 Can by Enteral route 5 times  daily        Medications reviewed:  Medications reconciled to facility chart and changes were made to reflect current medications as identified as above med list. Below are the changes that were made:   Medications stopped since last EPIC medication reconciliation:   There are no discontinued medications.    Medications started since last [facility] medication reconciliation:  No orders of the defined types were placed in this encounter.    REVIEW OF SYSTEMS:  4 point ROS including Respiratory, CV, GI and , other than that noted in the HPI,  is negative    Physical Exam:  /62  Pulse 92  Temp 97.8  F (36.6  C)  Resp 20  LMP 01/01/1982  SpO2 94%  GENERAL APPEARANCE:  Alert, in no distress, anxious, uncooperative  EYES:  Conjunctiva and lids normal  NECK:  soft, supply with good ROM.  RESP:  lungs clear to auscultation   CV:  no edema, rate-normal  ABDOMEN:  decreased bowel sounds in all quadrants.  slight discomfort.  No vomiting.  M/S:   Gait and station abnormal she is non ambulatory at this time and uses a wheelchair if she does get up.  Digits and nails normal  SKIN:  Inspection of skin and subcutaneous tissue baseline, Palpation of skin and subcutaneous tissue baseline  NEURO:   Cranial nerves 2-12 are normal tested and grossly at patient's baseline, Examination of sensation by touch normal  PSYCH:  insight and judgement impaired, memory impaired     Recent Labs:    Hospital Laboratory on 02/09/2017   Component Date Value Ref Range Status     Sodium 02/09/2017 137  133 - 144 mmol/L Final     Potassium 02/09/2017 4.3  3.4 - 5.3 mmol/L Final     Chloride 02/09/2017 96  94 - 109 mmol/L Final     Carbon Dioxide 02/09/2017 34* 20 - 32 mmol/L Final     Anion Gap 02/09/2017 7  3 - 14 mmol/L Final     Glucose 02/09/2017 108* 70 - 99 mg/dL Final     Urea Nitrogen 02/09/2017 28  7 - 30 mg/dL Final     Creatinine 02/09/2017 0.88  0.52 - 1.04 mg/dL Final     GFR Estimate 02/09/2017 63  >60 mL/min/1.7m2 Final    Non   American GFR Calc     GFR Estimate If Black 02/09/2017 77  >60 mL/min/1.7m2 Final    African American GFR Calc     Calcium 02/09/2017 9.6  8.5 - 10.1 mg/dL Final     WBC 02/09/2017 12.4* 4.0 - 11.0 10e9/L Final     RBC Count 02/09/2017 4.01  3.8 - 5.2 10e12/L Final     Hemoglobin 02/09/2017 12.7  11.7 - 15.7 g/dL Final     Hematocrit 02/09/2017 38.6  35.0 - 47.0 % Final     MCV 02/09/2017 96  78 - 100 fl Final     MCH 02/09/2017 31.7  26.5 - 33.0 pg Final     MCHC 02/09/2017 32.9  31.5 - 36.5 g/dL Final     RDW 02/09/2017 13.3  10.0 - 15.0 % Final     Platelet Count 02/09/2017 240  150 - 450 10e9/L Final     Diff Method 02/09/2017 Automated Method   Final     % Neutrophils 02/09/2017 80.1  % Final     % Lymphocytes 02/09/2017 7.8  % Final     % Monocytes 02/09/2017 7.8  % Final     % Eosinophils 02/09/2017 3.6  % Final     % Basophils 02/09/2017 0.4  % Final     % Immature Granulocytes 02/09/2017 0.3  % Final     Absolute Neutrophil 02/09/2017 9.9* 1.6 - 8.3 10e9/L Final     Absolute Lymphocytes 02/09/2017 1.0  0.8 - 5.3 10e9/L Final     Absolute Monocytes 02/09/2017 1.0  0.0 - 1.3 10e9/L Final     Absolute Eosinophils 02/09/2017 0.4  0.0 - 0.7 10e9/L Final     Absolute Basophils 02/09/2017 0.1  0.0 - 0.2 10e9/L Final     Abs Immature Granulocytes 02/09/2017 0.0  0 - 0.4 10e9/L Final     Vitamin B12 02/09/2017 1415* 193 - 986 pg/mL Final     Vitamin B1 Whole Blood Level 02/09/2017 158   Final    Comment: Reference range: 70 to 180  Unit: nmol/L  (Note)  INTERPRETIVE INFORMATION: Vitamin B1, Whole Blood  This assay measures the concentration of thiamine  diphosphate (TDP), the primary active form of vitamin B1.  Approximately 90 percent of vitamin B1 present in whole  blood is TDP. Thiamine and thiamine monophosphate, which  comprise the remaining 10 percent, are not measured.  Test developed and characteristics determined by [employer]. See Compliance Statement B: [email or web address]  Performed by [facility],  [address] [place] [address] [phone]  [email or web address], Honorio Bermudez MD, Lab. Director           Assessment/Plan:  1. Paralytic ileus (H)    2. Chronic obstructive pulmonary disease, unspecified COPD type (H)    3. Anxiety    4. Wernicke encephalopathy        Orders:  1.  Continue to use aggressive bowel program.  If bowel movement every other day then will need to have Dulcolax suppository and Enema.  2.  Continue on Reglan 10 mg po TID.  3.  No changes needed in breathing treatments.  4.  Continues to have issues with memory, logic and judgement.    Electronically signed by  Tosin Soriano CNP

## 2017-02-21 NOTE — PROGRESS NOTES
[place] [facility]    Chief Complaint   Patient presents with     Nursing Home Acute       HPI:    Arlin Gonzalez is a 71 year old  (1945), who is being seen today for an episodic care visit at [facility]. Today's concern is:  Depression with anxiety  CRISTINE (generalized anxiety disorder)  Arlin continues to be quite anxious, is using prn lorazepam occasionally. She is not taking routine antianxiety meds now.     Paralytic ileus (H)  Occasional constipation, occasionally refusing tube feedings. No weight loss yet.        REVIEW OF SYSTEMS:  4 point ROS including Respiratory, CV, GI and , other than that noted in the HPI,  is negative    GENERAL APPEARANCE:  Alert, in no distress, does appear anxious  CHEST/RESP:  respiratory effort normal, lung sounds CTA , no respiratory distress  CV:  Rate reg, rhythm reg, no murmur, no peripheral edema   ABD: BS + but slightly diminished.   PSYCH:  Alert and oriented to self with forgetfulness and confabulation, affect anxious much of the time, judgement impaired by cognitive losses     Assessment:  Depression with anxiety  CRISTINE (generalized anxiety disorder)  Stable, ongoing difficulty with anxiety. The current medical regimen is effective;  continue present plan and medications.     Paralytic ileus (H)  Stable, no acute findings. The current medical regimen is effective;  continue present plan and medications.        Plan:  No new orders     Time 15 minutes for patient visit and review of medical records    Electronically signed by  Radha Salazar CNP   [facility]  [phone] cell

## 2017-03-08 NOTE — PROGRESS NOTES
[facility]    Chief Complaint   Patient presents with     [facility] Regulatory       HPI:    Arlin Gonzalez is a 71 year old  (1945), who is being seen today for a federally mandated E/M visit at [facility] . Today's concerns are:   Failure to thrive in adult  Neuropathy (H)  Paralytic ileus (H)  CRISTINE (generalized anxiety disorder)  Major depressive disorder, recurrent episode, moderate (HCC)  Generalized muscle weakness  Feeding by G-tube (H)  Arlin continues to be mostly bed fast, though is encouraged to get out of bed 3 times per day. At times, she will do this. She continues to be anxious and confabulates stories but her speech is somewhat slower and more difficult to understand. She has also started to have increased spitting of oral secretions, verbalizing that she cannot swallow the saliva. She is able to swallow pills and some food. Her anxiety and confabulation actually seem to be improved since DC of scheduled anti-anxiety meds. She has significant stiffness of her neck, back, legs, arms, with some new decorticate type posturing of her feet and legs. She lies mostly in bed on her back. Bowels have been working, and no recent n/v. Flat plate of abdomen done yesterday negative for ileus.     ALLERGIES: Penicillins; Adhesive tape; Amoxicillin; Ceftin; Cefzil; Ciprofloxacin; Doxycycline; Erythromycin; Iodine; Ivp dye [contrast dye]; Levofloxacin; Macrobid [nitrofurantoin]; Metronidazole; Milk protein extract; Naprosyn [naproxen]; Prednisone; Shellfish allergy; Wheat bran; and Triamcinolone  PAST MEDICAL HISTORY:  has a past medical history of Chronic airway obstruction, not elsewhere classified; Colitis (11/17/2014); Endometriosis of other specified sites; Histoplasmosis; Kidney stone; Skin cancer; and Tobacco abuse (10/16/2012). She also has no past medical history of Basal cell carcinoma; Malignant melanoma (H); or Squamous cell carcinoma (H).  PAST SURGICAL HISTORY:   has a past surgical history that includes cholecystectomy, laporoscopic (); breast lumpectomy, rt/lt (); wedge resection (); breast biopsy, rt/lt (11/21/2008); mastectomy, mod radical (any) (12/30/2008); hysterectomy, pap no longer indicated; Mastectomy modified radical bilateral (3/31/2010); hysterectomy, maria c; Revise reconstructed breast bilateral (10/16/2012); Esophagoscopy, gastroscopy, duodenoscopy (EGD), combined (N/A, 9/25/2014); Esophagoscopy, gastroscopy, duodenoscopy (EGD), combined (N/A, 12/3/2014); picc insertion (Right, 12/4/2014); Esophageal motility study (12/8/14); and UGI ENDOSCOPY W PLACEMENT GASTROSTOMY TUBE PERCUT (N/A, 3/31/2016).  FAMILY HISTORY: family history includes Blood Disease in her mother; Cardiovascular in her brother; DIABETES in her father and paternal grandmother; HEART DISEASE in her brother, father, mother, and paternal grandfather; Hypertension in her brother and paternal grandfather.  SOCIAL HISTORY:  reports that she quit smoking about 2 years ago. Her smoking use included Cigarettes. She has a 20.00 pack-year smoking history. She has never used smokeless tobacco. She reports that she does not drink alcohol or use illicit drugs.    MEDICATIONS:  Current Outpatient Prescriptions   Medication Sig Dispense Refill     ACETAMINOPHEN RE Place 650 mg rectally every 6 hours as needed for mild pain or fever       ACETAMINOPHEN PO Take 650 mg by mouth every 6 hours as needed for pain       mineral oil-hydrophilic petrolatum (AQUAPHOR) Apply topically daily Apply to back       LACTOBACILLUS PO 3 times daily        EPINEPHrine 0.3 MG/0.3ML injection 2-pack Inject 0.3 mg into the muscle as needed for anaphylaxis       nystatin POWD Externally apply topically 2 times daily       LORAZEPAM PO Take 2 mg by mouth 2 times daily as needed        Nutritional Supplements (ISOSOURCE 1.5 SRINIVAS) LIQD 1 Can by Enteral route 5 times daily           Case Management:  I have reviewed  the care plan and MDS and do agree with the plan. Patient's desire to return to the community is present, but is not able due to care needs .  Information reviewed:  Medications, vital signs, orders, and nursing notes.    ROS:  Unobtainable secondary to cognitive impairment or aphasia.    Exam:  BP (!) 99/37  Pulse 81  LMP 01/01/1982  GENERAL APPEARANCE:  Alert, in no distress  HEAD:  Normal, normocephalic, atraumatic  EYE EXAM: normal external eye, conjunctiva, lids, ANNETTE  NECK EXAM: stiff, no JVD  CHEST/RESP:  respiratory effort normal, lung sounds CTA , no respiratory distress  CV:  Rate reg, rhythm reg, no murmur, no peripheral edema   GI/ABDOMEN:  diminished bowel sounds, firm, tender, no palpable masses   M/S:   extremities abnormal, gait abnormal-unable to ambulate, increased muscle tone, and range of motion limited by stiffness. Her toes/feet are very stiff, L foot is dorsi-flexed and R foot is pointed. Joints very stiff and she describes some discomfort when ROM is attempted. Arms are very stiff as well.   SKIN EXAM: No rash on skin, no open areas  NEUROLOGIC EXAM: Normal gross motor movement, tone and coordination. No tremor.  Cranial nerves 2-12 are normal tested and grossly at patient's baseline  PSYCH:  Alert and oriented to self and family, affect anxious, judgement impaired by cognitive impairment.     Lab/Diagnostic data:    Results for orders placed or performed in visit on 02/09/17   Basic metabolic panel   Result Value Ref Range    Sodium 137 133 - 144 mmol/L    Potassium 4.3 3.4 - 5.3 mmol/L    Chloride 96 94 - 109 mmol/L    Carbon Dioxide 34 (H) 20 - 32 mmol/L    Anion Gap 7 3 - 14 mmol/L    Glucose 108 (H) 70 - 99 mg/dL    Urea Nitrogen 28 7 - 30 mg/dL    Creatinine 0.88 0.52 - 1.04 mg/dL    GFR Estimate 63 >60 mL/min/1.7m2    GFR Estimate If Black 77 >60 mL/min/1.7m2    Calcium 9.6 8.5 - 10.1 mg/dL   CBC with platelets differential   Result Value Ref Range    WBC 12.4 (H) 4.0 - 11.0 10e9/L     RBC Count 4.01 3.8 - 5.2 10e12/L    Hemoglobin 12.7 11.7 - 15.7 g/dL    Hematocrit 38.6 35.0 - 47.0 %    MCV 96 78 - 100 fl    MCH 31.7 26.5 - 33.0 pg    MCHC 32.9 31.5 - 36.5 g/dL    RDW 13.3 10.0 - 15.0 %    Platelet Count 240 150 - 450 10e9/L    Diff Method Automated Method     % Neutrophils 80.1 %    % Lymphocytes 7.8 %    % Monocytes 7.8 %    % Eosinophils 3.6 %    % Basophils 0.4 %    % Immature Granulocytes 0.3 %    Absolute Neutrophil 9.9 (H) 1.6 - 8.3 10e9/L    Absolute Lymphocytes 1.0 0.8 - 5.3 10e9/L    Absolute Monocytes 1.0 0.0 - 1.3 10e9/L    Absolute Eosinophils 0.4 0.0 - 0.7 10e9/L    Absolute Basophils 0.1 0.0 - 0.2 10e9/L    Abs Immature Granulocytes 0.0 0 - 0.4 10e9/L   Vitamin B12   Result Value Ref Range    Vitamin B12 1415 (H) 193 - 986 pg/mL   Vitamin B1 whole blood   Result Value Ref Range    Vitamin B1 Whole Blood Level 158          ASSESSMENT/PLAN  Failure to thrive in adult  Arlin continues to lie in bed much of each day, often refusing to get out of bed. She is declining both physically and mentally.     Neuropathy (H)  Ongoing pain, poor mobility and new decorticate posturing.     Paralytic ileus (H)  Abd xray done yesterday was negative for paralytic ileus-with bowels working easily.     CRISTINE (generalized anxiety disorder)  Routine anxiety and is using only prn lorazepam. She has not had routinely scheduled lorazepam for several weeks.     Generalized muscle weakness  Weak with decorticate posturing, decreased ROM. Will add therapy as this may help with comfort/quality of life    Feeding by G-tube (H)  Stable, tolerating feedings without nausea, vomiting, no diarrhea    Orders:  1. PT/OT to eval and treat for decorticate posturing, ROM feet, ankles and neck  2. ST to eval swallowing/spitting and speech.    Total time spent with patient visit was 35 min including patient visit and review of past records.Greater than 50% of total time spent with counseling and coordinating  care.    Electronically signed by:  Radha Salazar CNP   [facility]  [phone] cell

## 2017-04-27 NOTE — PROGRESS NOTES
Chief Complaint   Patient presents with     Nursing Home Acute       HPI:    Arlin Gonzalez is a 71 year old  (1945), who is being seen today for an episodic care visit at [facility]. Today's concern is:   Skin breakdown   Around Gtube insertion site, with leaking of stomach contents.    Last 24 Fr 3 cm balloon-tipped button G-tube change 12/12/16 by Dr. Hollingsworth at [facility] in office without fluoroscopy. Note routed to [facility] for records.     Past Medical, Surgical, Family and Social History reviewed and updated.    REVIEW OF SYSTEMS:  Asleep, unable to answer simple questions today.     LABS:  Reviewed in facility records     PHYSICAL EXAM:    /68  Pulse 94  Temp 98.1  F (36.7  C)  Resp 18  Wt 142 lb (64.4 kg)  LMP 01/01/1982  BMI 23.63 kg/m2  GENERAL APPEARANCE:  Sound asleep, in no distress  HEAD:  Normal, normocephalic, atraumatic  CHEST/RESP:  respiratory effort normal , no respiratory distress  SKIN:  Inspection and Palpation of skin and subcutaneous tissue surrounding Gtube site -is reddened, excoriated and noted to have small amount of stomach contents leaking around site.    Assessment  Skin breakdown   Gtube last changed about 4 months ago, can be changed at facility.     Plan:  Will order needed supplies for routine Gtube change    1.  Change Gtube [id number]  2.  Change extension tube [id number]  3.  qshift skin care to gtube site-cleanse, apply a thin layer of zinc cream, cover with split 4x4    Total time spent with patient visit was 25 min including patient visit, review of past records and discussion with spouse   Greater than 50% of time spent in counseling and coordination of care    Radha Salazar CNP   [facility]  [phone] voice mail

## 2017-05-02 NOTE — PROGRESS NOTES
[facility]    Chief Complaint   Patient presents with     Nursing Home Acute       HPI:    Arlin Gonzalez is a 72 year old  (1945), who is being seen today for an episodic care visit at [facility] at [place]. Today's concern is:  Feeding by G-tube (H)  Ileus (H)  Protein-calorie malnutrition (H)  Wernicke encephalopathy  Patient with ongoing gtube feeding, with some oral gagging and spitting up of yellowish tinged stomach contents after bolus feedings and reports of gtube site excoriation. Recently changed to continuous tf to see if this improves. Arlin is generally alert but very confused, anxious at baseline.        ALLERGIES: Penicillins; Adhesive tape; Amoxicillin; Ceftin; Cefzil; Ciprofloxacin; Doxycycline; Erythromycin; Iodine; Ivp dye [contrast dye]; Levofloxacin; Macrobid [nitrofurantoin]; Metronidazole; Milk protein extract; Naprosyn [naproxen]; Prednisone; Shellfish allergy; Wheat bran; and Triamcinolone  Past Medical, Surgical, Family and Social History reviewed and updated in [facility].    Current Outpatient Prescriptions   Medication Sig Dispense Refill     bisacodyl (DULCOLAX) 10 MG Suppository Place 10 mg rectally daily And every 12hrs PRN       Metoclopramide HCl (REGLAN PO) Take 10 mg by mouth 3 times daily       ipratropium - albuterol 0.5 mg/2.5 mg/3 mL (DUONEB) 0.5-2.5 (3) MG/3ML neb solution Take 1 vial by nebulization 4 times daily       atropine 1 % ophthalmic solution Take 4 drops by mouth every 4 hours as needed       sodium phosphate (FLEET ENEMA) 7-19 GM/118ML rectal enema Place 1 enema rectally as needed for constipation       ACETAMINOPHEN PO Take 650 mg by mouth every 6 hours as needed for pain       mineral oil-hydrophilic petrolatum (AQUAPHOR) Apply topically daily Apply to back       LACTOBACILLUS PO 3 times daily        EPINEPHrine 0.3 MG/0.3ML injection 2-pack Inject 0.3 mg into the muscle as needed for anaphylaxis       nystatin POWD Externally apply topically  "2 times daily       Nutritional Supplements (ISOSOURCE 1.5 SRINIVAS) LIQD 1 Can by Enteral route 5 times daily          Medications reconciled to facility chart and changes were made to reflect current medications as identified as above med list. Below are the changes that were made:   Medications stopped since last EPIC medication reconciliation:   Medications Discontinued During This Encounter   Medication Reason     ACETAMINOPHEN RE      LORAZEPAM PO        Medications started since last [facility] medication reconciliation:  Orders Placed This Encounter   Medications     bisacodyl (DULCOLAX) 10 MG Suppository     Sig: Place 10 mg rectally daily And every 12hrs PRN     Metoclopramide HCl (REGLAN PO)     Sig: Take 10 mg by mouth 3 times daily     ipratropium - albuterol 0.5 mg/2.5 mg/3 mL (DUONEB) 0.5-2.5 (3) MG/3ML neb solution     Sig: Take 1 vial by nebulization 4 times daily     atropine 1 % ophthalmic solution     Sig: Take 4 drops by mouth every 4 hours as needed     sodium phosphate (FLEET ENEMA) 7-19 GM/118ML rectal enema     Sig: Place 1 enema rectally as needed for constipation         REVIEW OF SYSTEMS:  Unobtainable secondary to cognitive impairment or aphasia, but today pt reports \"I'm OK\"    PHYSICAL EXAM:  /58  Pulse 83  Temp 96  F (35.6  C)  Resp 18  Wt 131 lb 3.2 oz (59.5 kg)  LMP 01/01/1982  SpO2 91%  BMI 21.83 kg/m2  GENERAL APPEARANCE:  Alert, in no distress  HEAD:  Normal, normocephalic, atraumatic  EYE EXAM: normal external eye, conjunctiva, lids, ANNETTE  NECK EXAM: supple, no JVD  CHEST/RESP:  respiratory effort normal, lung sounds CTA , no respiratory distress  CV:  Rate reg, rhythm reg, no murmur, no peripheral edema   GI/ABDOMEN:  normal bowel sounds, soft, slightly tender to palpation, no palpable masses  M/S:   extremities abnormal, gait abnormal-does not ambulate, increased muscle tone, and range of motion decreased, decorticate posturing and contractures  SKIN EXAM: skin around gtube " with decreased excoriation, decreased odor from last week but still with some redness, small amount of drainage  NEUROLOGIC EXAM: Normal gross motor movement, tone and coordination. No tremor.  Cranial nerves 2-12 are normal tested and grossly at patient's baseline  PSYCH:  Alert and oriented to self and family, affect anxious, judgement impaired by cognitive losses     RECENT LABS:    Hospital Laboratory on 04/13/2017   Component Date Value Ref Range Status     Vitamin B12 04/13/2017 1097* 193 - 986 pg/mL Final         ASSESSMENT/PLAN:  Feeding by G-tube (H)  Ileus (H)  Protein-calorie malnutrition (H)  gtube feeding continuous seems to cause less spitting of phlegm/stomach contents and this does not impact her quality of life as she spends most of her time bedfast. She is having regular BM, sometimes loose. Xray monthly for review of chronic ileus.     Wernicke encephalopathy  Advanced memory changes with anxiety, worsening ability to interact with her surroundings. She sleeps much of the time. Usually spends most of each day in bed. Spouse is aware of and understanding of ongoing and chronic decline. The current medical regimen is effective;  continue present plan and medications. Goals of care focused on comfort and quality of life.       ORDERS:  1.  DC Lorazepam-no need for over 1 month.      Total time spent with patient visit was 25 min including patient visit, review of past records and discussion with spouse   Greater than 50% of total time spent with counseling and coordinating care    Electronically signed by  Radha Salazar CNP   [facility]  [phone] cell

## 2017-05-09 NOTE — PROGRESS NOTES
"  [facility]    Chief Complaint   Patient presents with     [facility] Regulatory       HPI:   Obtained from the patient, medical record and from the [facility] staffs.     Arlin Gonzalez is a 72 year old  (1945), who is being seen today for a federally mandated E/M visit [facility] [place]. Today's concerns are:  Wernicke encephalopathy  - Reported to be 2/2 to vitamin deficiency.    - \"Continues to confabulate stories regularly and this is sometimes distressing to the resident\"  - Reported to  Be declining in general.     Failure to thrive in adult  - wt stable.  - bed-bound  - no potential rehab.     Depression with anxiety  - very anxious most of the time  - no concern at this time      Feeding by G-tube (H)  - Gets reflex/cough, spits up yellowish sputum at times).  - Did not tolerate bolus feeding, hence on continuous feeding.     _________________________________  # Past Medical, social, family histories, medications, and allergies reviewed and updated    MEDICATIONS:  Current Outpatient Prescriptions   Medication Sig Dispense Refill     bisacodyl (DULCOLAX) 10 MG Suppository Place 10 mg rectally daily And every 12hrs PRN       Metoclopramide HCl (REGLAN PO) Take 10 mg by mouth 3 times daily       ipratropium - albuterol 0.5 mg/2.5 mg/3 mL (DUONEB) 0.5-2.5 (3) MG/3ML neb solution Take 1 vial by nebulization 4 times daily       atropine 1 % ophthalmic solution Take 4 drops by mouth every 4 hours as needed       sodium phosphate (FLEET ENEMA) 7-19 GM/118ML rectal enema Place 1 enema rectally as needed for constipation       ACETAMINOPHEN PO Take 650 mg by mouth every 6 hours as needed for pain       mineral oil-hydrophilic petrolatum (AQUAPHOR) Apply topically daily Apply to back       LACTOBACILLUS PO 3 times daily        EPINEPHrine 0.3 MG/0.3ML injection 2-pack Inject 0.3 mg into the muscle as needed for anaphylaxis       nystatin POWD Externally apply topically 2 times daily   "     Nutritional Supplements (ISOSOURCE 1.5 SRINIVAS) LIQD 1 Can by Enteral route 5 times daily        Medications reviewed: Reviewed in the chart and EHR.        Case Management:  I have reviewed the care plan and MDS and do agree with the plan. Patient's desire to return to the community is not present.  Information reviewed:  Medications, vital signs, orders, and nursing notes.    ROS:  10 point ROS of systems including Constitutional, Eyes, Respiratory, Cardiovascular, Gastroenterology, Genitourinary, Integumentary, Muscularskeletal, Psychiatric were all negative except for pertinent positives noted in my HPI.    Exam:  Vitals: /58  Pulse 83  Temp 96  F (35.6  C)  Resp 18  Wt 131 lb 3.2 oz (59.5 kg)  LMP 01/01/1982  SpO2 92%  BMI 21.83 kg/m2  BMI= Body mass index is 21.83 kg/(m^2).  GENERAL APPEARANCE:  Alert, anxious, cooperative  ENT:  Mouth and posterior oropharynx normal, moist mucous membranes, implanted teeth  EYES:  EOM, conjunctivae, lids, pupils and irises normal  NECK:  No adenopathy,masses or thyromegaly  RESP:  respiratory effort and palpation of chest normal, no respiratory distress,   CV:  Palpation and auscultation of heart done , regular rate and rhythm, no murmur, rub, or gallop, no edema  ABDOMEN:  normal bowel sounds, soft, nontender, no hepatosplenomegaly or other masses, G tube in place.    M/S:   Gait and station abnormal bed fast and wc bound, proximal muscle weakness.  SKIN:  no rash or skin lesion noted. Absent breast b/l.   NEURO:   Cranial nerves 2-12 are normal tested and grossly at patient's baseline, no purposeful movement in upper and lower extremities  PSYCH:  oriented to name and place. Anxious mood and affect. Judgement and memory impaired. Speech hard to follow at times due to facial muscles spasm.     Lab/Diagnostic data:    Hospital Laboratory on 04/13/2017   Component Date Value Ref Range Status     Vitamin B12 04/13/2017 1097* 193 - 986 pg/mL Final          ASSESSMENT/PLAN  Wernicke encephalopathy  - chronic and stable. On vitamins supplement    Failure to thrive in adult  - Significant  Deficits requiring NH placement. Requiring extensive assistance from nursing. Up for meals only o/w spends the day resting in bed      Depression with anxiety  - stable.Lorazepam successfully GDR.     Feeding by G-tube (H)  - continue continuous feeding.       Orders:  - The current medical regimen is effective;  continue present plan and medications.    Total time spent with patient visit at the skilled nursing facility was 35 min including patient visit, discuss with GNP and nursing staffs, review medical records since the Writer's last visit, and labs results.     Electronically signed by:  Vinicius Moreno MD

## 2017-07-05 NOTE — PROGRESS NOTES
[facility]    Chief Complaint   Patient presents with     half-way Regulatory       HPI:    Arlin Gonzalez is a 72 year old  (1945), who is being seen today for a federally mandated E/M visit at [facility] [place].  HPI information obtained from: facility chart records and facility staff. Today's concerns are:  Wernicke encephalopathy  General decline.  Bed bound. No rehab potential    Protein-calorie malnutrition (H)  Weight stable    Feeding by G-tube (H)  Continuous feeding without difficulty.  Occasional yellow colored sputum coughed up  Abdominal x-ray shows no apparent pneumoperitoneum or abnormally dilated bowel.  Nonobstructive bowel gas pattern. G tube over gastric region.        ALLERGIES: Penicillins; Adhesive tape; Amoxicillin; Ceftin; Cefzil; Ciprofloxacin; Doxycycline; Erythromycin; Iodine; Ivp dye [contrast dye]; Levofloxacin; Macrobid [nitrofurantoin]; Metronidazole; Milk protein extract; Naprosyn [naproxen]; Prednisone; Shellfish allergy; Wheat bran; and Triamcinolone  PAST MEDICAL HISTORY:  has a past medical history of Chronic airway obstruction, not elsewhere classified; Colitis (11/17/2014); Endometriosis of other specified sites; Histoplasmosis; Kidney stone; Skin cancer; and Tobacco abuse (10/16/2012). She also has no past medical history of Basal cell carcinoma; Malignant melanoma (H); or Squamous cell carcinoma (H).  PAST SURGICAL HISTORY:  has a past surgical history that includes cholecystectomy, laporoscopic (); breast lumpectomy, rt/lt (); wedge resection (); breast biopsy, rt/lt (11/21/2008); mastectomy, mod radical (any) (12/30/2008); hysterectomy, pap no longer indicated; Mastectomy modified radical bilateral (3/31/2010); hysterectomy, maria c; Revise reconstructed breast bilateral (10/16/2012); Esophagoscopy, gastroscopy, duodenoscopy (EGD), combined (N/A, 9/25/2014); Esophagoscopy, gastroscopy, duodenoscopy (EGD), combined (N/A,  12/3/2014); picc insertion (Right, 12/4/2014); Esophageal motility study (12/8/14); and UGI ENDOSCOPY W PLACEMENT GASTROSTOMY TUBE PERCUT (N/A, 3/31/2016).  FAMILY HISTORY: family history includes Blood Disease in her mother; Cardiovascular in her brother; DIABETES in her father and paternal grandmother; HEART DISEASE in her brother, father, mother, and paternal grandfather; Hypertension in her brother and paternal grandfather.  SOCIAL HISTORY:  reports that she quit smoking about 2 years ago. Her smoking use included Cigarettes. She has a 20.00 pack-year smoking history. She has never used smokeless tobacco. She reports that she does not drink alcohol or use illicit drugs.    MEDICATIONS:  Current Outpatient Prescriptions   Medication Sig Dispense Refill     bisacodyl (DULCOLAX) 10 MG Suppository Place 10 mg rectally daily And every 12hrs PRN       Metoclopramide HCl (REGLAN PO) Take 10 mg by mouth 3 times daily       ipratropium - albuterol 0.5 mg/2.5 mg/3 mL (DUONEB) 0.5-2.5 (3) MG/3ML neb solution Take 1 vial by nebulization 4 times daily       atropine 1 % ophthalmic solution Take 4 drops by mouth every 4 hours as needed       sodium phosphate (FLEET ENEMA) 7-19 GM/118ML rectal enema Place 1 enema rectally as needed for constipation       ACETAMINOPHEN PO Take 650 mg by mouth every 6 hours as needed for pain       mineral oil-hydrophilic petrolatum (AQUAPHOR) Apply topically daily Apply to back       LACTOBACILLUS PO 3 times daily        EPINEPHrine 0.3 MG/0.3ML injection 2-pack Inject 0.3 mg into the muscle as needed for anaphylaxis       nystatin POWD Externally apply topically 2 times daily       Nutritional Supplements (ISOSOURCE 1.5 SRINIVAS) LIQD 1 Can by Enteral route 5 times daily        Medications reviewed:  Medications reconciled to facility chart and changes were made to reflect current medications as identified as above med list. Below are the changes that were made:   Medications stopped since last  EPIC medication reconciliation:   There are no discontinued medications.    Medications started since last [facility] medication reconciliation:  No orders of the defined types were placed in this encounter.        Case Management:  I have reviewed the care plan and MDS and do agree with the plan. Patient's desire to return to the community is not assessible due to cognitive impairment.  Information reviewed:  Medications, vital signs, orders, and nursing notes.    ROS:  Unobtainable secondary to cognitive impairment or aphasia.    Exam:  Vitals: /56  Pulse 95  Temp 97.1  F (36.2  C)  Resp 20  Wt 136 lb 3.2 oz (61.8 kg)  LMP 01/01/1982  SpO2 95%  BMI 22.66 kg/m2  BMI= Body mass index is 22.66 kg/(m^2).  GENERAL APPEARANCE:  Alert, anxious, staring, not interacting  ENT:  Mouth and posterior oropharynx normal, moist mucous membranes  EYES:  EOM, conjunctivae, lids, pupils and irises normal  NECK:  No adenopathy,masses or thyromegaly  RESP:  respiratory effort and palpation of chest normal, lungs clear to auscultation , no respiratory distress  CV:  Palpation and auscultation of heart done , regular rate and rhythm, no murmur, rub, or gallop, no edema  ABDOMEN:  normal bowel sounds, soft, nontender, no hepatosplenomegaly or other masses, G tube in place LUQ  M/S:   Gait and station abnormal bed fast, wc bound.  Contractures feet, toes, hands  SKIN:  no lesions noted  NEURO:   Cranial nerves 2-12 are normal tested and grossly at patient's baseline, no purposeful movement in upper and lower extremities  PSYCH:  anxious    Lab/Diagnostic data:    Hospital Laboratory on 04/13/2017   Component Date Value Ref Range Status     Vitamin B12 04/13/2017 1097* 193 - 986 pg/mL Final         ASSESSMENT/PLAN  Wernicke encephalopathy  Chronic, stable, no changes    Protein-calorie malnutrition (H)  Feeding by G-tube (H)  Stable with continuous feeding      Orders:  No new orders   The current medical regimen is effective;   continue present plan and medications    Total time spent with patient visit at the skilled nursing facility was 25 min including patient visit and review of past records. Greater than 50% of total time spent with counseling and coordinating care due to chronic condition    Electronically signed by:  LYLE Hammer CNP

## 2017-07-21 NOTE — PROGRESS NOTES
[place] [facility]    Chief Complaint   Patient presents with     Nursing Home Acute       HPI:    Arlin Gonzalez is a 72 year old  (1945), who is being seen today for an episodic care visit at [facility] [place].  HPI information obtained from: facility chart records, facility staff and patient report.Today's concern is:  Viral conjunctivitis  Staff report patient has had red, goopy eye for 2-3 days.  Patient can answer yes/no questions and indicates that she has no vision changes, no pain, rare itching.      ALLERGIES: Penicillins; Adhesive tape; Amoxicillin; Ceftin; Cefzil; Ciprofloxacin; Doxycycline; Erythromycin; Iodine; Ivp dye [contrast dye]; Levofloxacin; Macrobid [nitrofurantoin]; Metronidazole; Milk protein extract; Naprosyn [naproxen]; Prednisone; Shellfish allergy; Wheat bran; and Triamcinolone      Current Outpatient Prescriptions   Medication Sig Dispense Refill     naphazoline (NAPHCON) 0.1 % ophthalmic solution Place 2 drops into both eyes 4 times daily       bisacodyl (DULCOLAX) 10 MG Suppository Place 10 mg rectally daily And every 12hrs PRN       Metoclopramide HCl (REGLAN PO) Take 10 mg by mouth 3 times daily       atropine 1 % ophthalmic solution Take 4 drops by mouth every 4 hours as needed       sodium phosphate (FLEET ENEMA) 7-19 GM/118ML rectal enema Place 1 enema rectally as needed for constipation       ACETAMINOPHEN PO Take 650 mg by mouth every 6 hours as needed for pain       mineral oil-hydrophilic petrolatum (AQUAPHOR) Apply topically daily Apply to back       LACTOBACILLUS PO 3 times daily        EPINEPHrine 0.3 MG/0.3ML injection 2-pack Inject 0.3 mg into the muscle as needed for anaphylaxis       nystatin POWD Externally apply topically 2 times daily       Nutritional Supplements (ISOSOURCE 1.5 SRINIVAS) LIQD 1 Can by Enteral route 5 times daily              REVIEW OF SYSTEMS:  4 point ROS including Respiratory, CV, GI and , other than that noted in the HPI,  is  negative    Physical Exam:  /66  Pulse 85  Temp 97.4  F (36.3  C)  Resp 18  Wt 132 lb (59.9 kg)  LMP 01/01/1982  SpO2 95%  BMI 21.97 kg/m2  GENERAL APPEARANCE:  Alert, anxious  EYES:  EOM, conjunctivae, lids, pupils and irises normal, left eye with moderate amount exudate at inner canthus.  exudate is serous but thick enough to mat on the eye lashes.  Left eue > right eye.  Very mild scleral injection left eye  NECK:  no adenopathy    Recent Labs:  All labs reviewed, none recent    Assessment/Plan:  Viral conjunctivitis  Will treat for conjunctivitis conservativeluy      Orders:  1. Cool compress to eyes QID x 7 days   2. Naphazoline eye drops 0.1%  2 drops each eye QID x 7 days. Dx: Conjunctivitis.        Electronically signed by  LYLE Hammer CNP

## 2017-07-22 NOTE — PROGRESS NOTES
Received a message from nursing that the medication prescribed for conjunctivitis is no longer produced by manufacture.    This NP called the pharmacist at [facility] where [place] gets there medications from.  Together reviewed the allergy list and only 2 suggestions from pharmacy was an antihistimine Pataday or Cromolyn 4x day    PLAN  Called the NH back and ordered Pataday drops 1 gtt OU twice a day x7 days and hope this takes down the inflammation.    Electronically signed by Jacqui Garcia RN, CNP

## 2017-08-18 NOTE — PROGRESS NOTES
[facility]    Chief Complaint   Patient presents with     Nursing Home Acute       HPI:    Arlin Gonzalez is a 72 year old  (1945), who is being seen today for an episodic care visit at [facility].    HPI information obtained from: facility chart records, facility staff and patient report. Today's concern is:  Viral conjunctivitis  Since discontinuing the eye drops, patient eyes have copious amounts of mucus every morning.  Mucus seems to refill immediately after wiped away.  Patient states drops helped.  Now, eyes hurt, no vision affected      REVIEW OF SYSTEMS:  Unobtainable secondary to cognitive impairment or aphasia, but today pt reports no concerns    /66  Pulse 88  Temp 94.6  F (34.8  C)  Resp 18  Wt 133 lb (60.3 kg)  LMP 01/01/1982  SpO2 93%  BMI 22.13 kg/m2  GENERAL APPEARANCE:  Alert, in no distress  Both eyes have moderate amount tan colored exudate    ASSESSMENT/PLAN:  Viral conjunctivitis  Will resume panatol eye drops    Patanol 0.1% - 1 drop in both eyes bid      LYLE Hammer CNP

## 2017-08-24 NOTE — ED AVS SNAPSHOT
[facility] Emergency Department    [address]    [place] [address]    Phone:  [phone]    Fax:  [phone]                                       Arlin Gonzalez   MRN: [id number]    Department:  [facility] Emergency Department   Date of Visit:  8/24/2017           Patient Information     Date Of Birth          1945        Your diagnoses for this visit were:     Dislodged gastrostomy tube (H) Replaced with an BALAJI 18 French, 3 inch gastrostomy tube       You were seen by Zachary Herrera MD.      Follow-up Information     Schedule an appointment as soon as possible for a visit with Pretty Johnson APRN CNP.    Specialty:  Nurse Practitioner - Family    Why:  Primary Care Provider.    Contact information:    [address]  [place] [address]  [phone]        Discharge References/Attachments     CARING FOR YOUR GASTROSTOMY TUBE (G-TUBE), DISCHARGE INSTRUCTIONS: (ENGLISH)      Future Appointments        Provider Department Dept Phone Center    8/25/2017 7:00 AM LYLE Hammer CNP [facility] [phone] [facility]      24 Hour Appointment Hotline       To make an appointment at any [facility] call [phone] ([phone]). If you don't have a family doctor or clinic, we will help you find one. [facility] clinics are conveniently located to serve the needs of you and your family.             Review of your medicines      Our records show that you are taking the medicines listed below. If these are incorrect, please call your family doctor or clinic.        Dose / Directions Last dose taken    ACETAMINOPHEN PO   Dose:  650 mg        Take 650 mg by mouth every 6 hours as needed for pain   Refills:  0        atropine 1 % ophthalmic solution   Dose:  4 drop        Take 4 drops by mouth every 4 hours as needed   Refills:  0        bisacodyl 10 MG Suppository   Commonly known as:  DULCOLAX   Dose:  10 mg        Place 10 mg rectally daily And every  12hrs PRN   Refills:  0        EPINEPHrine 0.3 MG/0.3ML injection 2-pack   Commonly known as:  EPIPEN/ADRENACLICK/or ANY BX GENERIC EQUIV   Dose:  0.3 mg        Inject 0.3 mg into the muscle as needed for anaphylaxis   Refills:  0        ISOSOURCE 1.5 SRINIVAS Liqd   Dose:  1 Can        1 Can by Enteral route 5 times daily   Refills:  0        LACTOBACILLUS PO        3 times daily   Refills:  0        mineral oil-hydrophilic petrolatum        Apply topically daily Apply to back   Refills:  0        nystatin Powd        Externally apply topically 2 times daily   Refills:  0        olopatadine 0.1 % ophthalmic solution   Commonly known as:  PATANOL   Dose:  1 drop        Place 1 drop into both eyes 2 times daily   Refills:  0        REGLAN PO   Dose:  10 mg        Take 10 mg by mouth 3 times daily   Refills:  0        sodium phosphate 7-19 GM/118ML rectal enema   Dose:  1 enema        Place 1 enema rectally as needed for constipation   Refills:  0                Procedures and tests performed during your visit     XR KUB      Orders Needing Specimen Collection     None      Pending Results     Date and Time Order Name Status Description    8/24/2017 1643 XR KUB Preliminary             Pending Culture Results     No orders found from 8/22/2017 to 8/25/2017.            Pending Results Instructions     If you had any lab results that were not finalized at the time of your Discharge, you can call the ED Lab Result RN at [phone]. You will be contacted by this team for any positive Lab results or changes in treatment. The nurses are available 7 days a week from 10A to 6:30P.  You can leave a message 24 hours per day and they will return your call.        Test Results From Your Hospital Stay        8/24/2017  5:05 PM      Narrative     ABDOMEN ONE VIEW  8/24/2017  4:53 PM     COMPARISON: None.    HISTORY: Gastrostomy tube replacement.        Impression     IMPRESSION: There is a short percutaneous gastrostomy tube in  place  with its balloon inflated in the gastric antrum. There is no evidence  for free intraperitoneal air or bowel obstruction. Cholecystectomy  changes are noted.                Thank you for choosing [facility]       Thank you for choosing [facility] for your care. Our goal is always to provide you with excellent care. Hearing back from our patients is one way we can continue to improve our services. Please take a few minutes to complete the written survey that you may receive in the mail after you visit with us. Thank you!        [employer] Information     [employer] gives you secure access to your electronic health record. If you see a primary care provider, you can also send messages to your care team and make appointments. If you have questions, please call your primary care clinic.  If you do not have a primary care provider, please call [phone] and they will assist you.        Care EveryWhere ID     This is your Care EveryWhere ID. This could be used by other organizations to access your [facility] medical records  [id number]        Equal Access to Services     PINKY MCNEIL : Giovanna Paz, angela ramon, ama herbert, jackie mo . So [facility] [phone].    ATENCIÓN: Si habla español, tiene a brush disposición servicios gratuitos de asistencia lingüística. Llame al [phone].    We comply with applicable federal civil rights laws and [place] laws. We do not discriminate on the basis of race, color, national origin, age, disability sex, sexual orientation or gender identity.            After Visit Summary       This is your record. Keep this with you and show to your community pharmacist(s) and doctor(s) at your next visit.

## 2017-08-24 NOTE — ED PROVIDER NOTES
History     Chief Complaint   Patient presents with     Gtube Problem     The history is provided by the nursing home and medical records.      History obtained from group home staff and review of previous records.  Patient is noncommunicative.  Arlin Gonzalez is a 72 year old female group home patient with gastrostomy tube who had accidental removal or displacement of her gastrostomy tube at the nursing home this am.  Records reveal an 18 Estonian gastrostomy tube,  but this is not further described and gastrostomy tube was not brought with her for replacement.   Patient is noncommunicative, can provide no reliable history.  No apparent abdominal pain, nausea or vomiting or fever.  No gastrostomy tube site bleeding.  Previous records:  18 Slovak G tube placed during hospitalization of 12/2014 with initiation of feeding with isosource 1.5 - goal of 4 cans per day to meet 70-80% of nutritional needs.      I have reviewed the Medications, Allergies, Past Medical and Surgical History, and Social History in the Epic system.  Patient Active Problem List   Diagnosis     COPD (chronic obstructive pulmonary disease) (HCC)     Vitamin D deficiency     Cataract     CARDIOVASCULAR SCREENING; LDL GOAL LESS THAN 130     Streptococcus group B infection     Carrier of group B Streptococcus     Breast cancer (H)     Post-lymphadenectomy lymphedema of arm     GERD (gastroesophageal reflux disease)     Esophageal dysmotility     Protein-calorie malnutrition (H)     Feeding by G-tube (H)     Eating disorder     Irritable bowel syndrome with diarrhea     Vitamin B12 deficiency (non anemic)     Generalized muscle weakness     Failure to thrive in adult     Osteoporosis     Chronic respiratory failure with hypercapnia (H)     Wernicke encephalopathy     Depression with anxiety     CRISTINE (generalized anxiety disorder)     Major depressive disorder, recurrent episode, moderate (HCC)     Advanced directives, counseling/discussion      Neuropathy (H)     Paralytic ileus (H)     Slow transit constipation     Past Medical History:   Diagnosis Date     Chronic airway obstruction, not elsewhere classified      Colitis 11/17/2014     Endometriosis of other specified sites      Histoplasmosis      Kidney stone      Skin cancer     Patient states she has had skin cancer, but does not know what type.      Tobacco abuse 10/16/2012     Past Surgical History:   Procedure Laterality Date     BREAST BIOPSY, RT/LT  11/21/2008     BREAST LUMPECTOMY, RT/LT      Breast Lumpectomy LT     CHOLECYSTECTOMY, LAPOROSCOPIC      Cholecystectomy, Laparoscopic     ESOPHAGEAL MOTILITY STUDY  12/8/14     ESOPHAGOSCOPY, GASTROSCOPY, DUODENOSCOPY (EGD), COMBINED N/A 9/25/2014    Procedure: COMBINED ESOPHAGOSCOPY, GASTROSCOPY, DUODENOSCOPY (EGD);  Surgeon: Jamari Lau MD;  Location: [facility]     ESOPHAGOSCOPY, GASTROSCOPY, DUODENOSCOPY (EGD), COMBINED N/A 12/3/2014    Procedure: COMBINED ESOPHAGOSCOPY, GASTROSCOPY, DUODENOSCOPY (EGD), BIOPSY SINGLE OR MULTIPLE;  Surgeon: Von Ernandez Chi, MD;  Location:  GI     HC UGI ENDOSCOPY W PLACEMENT GASTROSTOMY TUBE PERCUT N/A 3/31/2016    Procedure: COMBINED ESOPHAGOSCOPY, GASTROSCOPY, DUODENOSCOPY (EGD), PLACE PERCUTANEOUS ENDOSCOPIC GASTROSTOMY TUBE;  Surgeon: Jamari Lau MD;  Location: [facility]     HYSTERECTOMY, PAP NO LONGER INDICATED       HYSTERECTOMY, YVONNE       MASTECTOMY MODIFIED RADICAL BILATERAL  3/31/2010    Mastectomy Mod Radical ERUM Excision and complex closure of bilateral mastectomy and chest wounds, scar release of left pectooralis major muscle     MASTECTOMY, MOD RADICAL (ANY)  12/30/2008    Bilateral      PICC INSERTION Right 12/4/2014    5fr DL Power PICC, 40 cm (1cm external) in the R basilic vein w/ tip in the low SVC.     REVISE RECONSTRUCTED BREAST BILATERAL  10/16/2012    Procedure: REVISE RECONSTRUCTED BREAST BILATERAL;  Bilateral revision breast reconstruction including liposuction;  Surgeon:  "Deena Ponce MD;  Location: [facility]     WEDGE RESECTION      Right lung resection     No current facility-administered medications for this encounter.      Current Outpatient Prescriptions   Medication     olopatadine (PATANOL) 0.1 % ophthalmic solution     Metoclopramide HCl (REGLAN PO)     ACETAMINOPHEN PO     LACTOBACILLUS PO     Nutritional Supplements (ISOSOURCE 1.5 SRINIVAS) LIQD     bisacodyl (DULCOLAX) 10 MG Suppository     atropine 1 % ophthalmic solution     sodium phosphate (FLEET ENEMA) 7-19 GM/118ML rectal enema     mineral oil-hydrophilic petrolatum (AQUAPHOR)     EPINEPHrine 0.3 MG/0.3ML injection 2-pack     nystatin POWD     Allergies   Allergen Reactions     Penicillins Shortness Of Breath and Hives     Adhesive Tape Other (See Comments)     Dermatitis       Amoxicillin Hives     Dyspnea.      Ceftin Hives     Cefzil Hives     Ciprofloxacin Cramps     Doxycycline Hives     Erythromycin Hives     Iodine Hives     Ivp Dye [Contrast Dye] Other (See Comments)     Cardiac arrest     Levofloxacin Other (See Comments)     \"It gives me troubles with my legs and I break blood vessels.\"     Macrobid [Nitrofurantoin] Other (See Comments)     Patient reports that she experiences \"dizziness\" with this medication.     Metronidazole Swelling     Chin and lips swelling, pins and needles feeling in lips and back of head     Milk Protein Extract Nausea     Especially with Milk and Ice Cream-cheese OK     Naprosyn [Naproxen] Hives     Prednisone Hives     Shellfish Allergy Other (See Comments)     Cardiac arrest     Wheat Bran Unknown     Triamcinolone Rash     Social History   Substance Use Topics     Smoking status: Former Smoker     Packs/day: 0.50     Years: 40.00     Types: Cigarettes     Quit date: 10/7/2014     Smokeless tobacco: Never Used     Alcohol use No     Family History   Problem Relation Age of Onset     HEART DISEASE Mother      Blood Disease Mother      pernicious anemia     HEART DISEASE " Father      DIABETES Father      DIABETES Paternal Grandmother      HEART DISEASE Paternal Grandfather      Hypertension Paternal Grandfather      Cardiovascular Brother      HEART DISEASE Brother      Hypertension Brother        Review of Systems   Unable to perform ROS: Patient nonverbal       Physical Exam   BP: 128/74  Pulse: 110  Temp: 97.7  F (36.5  C)  Resp: 18  SpO2: 95 %    Physical Exam   Constitutional: She appears well-developed and well-nourished. No distress.   HENT:   Head: Normocephalic and atraumatic.   Oral mucosa dry.   Eyes: Conjunctivae and EOM are normal. No scleral icterus.   Neck: Normal range of motion. Neck supple. No tracheal deviation present. No thyromegaly present.   Cardiovascular: Normal rate, regular rhythm and normal heart sounds.  Exam reveals no gallop and no friction rub.    No murmur heard.  Pulmonary/Chest: Effort normal and breath sounds normal. No respiratory distress. She has no wheezes. She has no rales.   Abdominal: Soft. Bowel sounds are normal. She exhibits no distension. There is no tenderness. There is no rebound and no guarding.   Gastrostomy tube site appears normal.   Musculoskeletal: Normal range of motion. She exhibits no edema.   Neurological: She is alert.   Noncommunicative.   Skin: Skin is warm and dry. No rash noted. She is not diaphoretic. No erythema. No pallor.   Psychiatric: Her behavior is normal.   Flat affect.   Nursing note and vitals reviewed.      ED Course     ED Course     Feeding tube replacement  Performed by: ASHLEY SEQUEIRA  Authorized by: ASHLEY SEQUEIRA   Consent: Verbal consent not obtained.  Required items: required blood products, implants, devices, and special equipment available  Patient identity confirmed: arm band  Preparation: Patient was prepped and draped in the usual sterile fashion.  Indications: tube dislodged  Local anesthesia used: no    Anesthesia:  Local anesthesia used: no    Sedation:  Patient sedated: no  Tube type:  gastrostomy  Patient position: supine  Procedure type: replacement  Tube size: 18 Fr  Endoscope used: no  Bulb inflation fluid: normal saline  Placement/position confirmation: x-ray  Tube placement difficulty: minimal  Patient tolerance: Patient tolerated the procedure well with no immediate complications  Comments: Patient unable to provide consent and is nonverbal.         Results for orders placed or performed during the hospital encounter of 08/24/17   XR KUB    Narrative    [facility]  8/24/2017  4:53 PM     COMPARISON: None.    HISTORY: Gastrostomy tube replacement.      Impression    IMPRESSION: There is a short percutaneous gastrostomy tube in place  with its balloon inflated in the gastric antrum. There is no evidence  for free intraperitoneal air or bowel obstruction. Cholecystectomy  changes are noted.    LEDY SKINNER MD     I independently reviewed the X-rays: Agree with the Radiologist's interpretation.         Labs Ordered and Resulted from Time of ED Arrival Up to the Time of Departure from the ED - No data to display    2:56 PM - still attempting to obtain a replacement feeding tube. Unsure of which tube was previously in place and not brought with her to the ED. Prev records unclear about this. Will contact nursing home.    Nursing home contacted. Gastrostomy tube came out while showering this am and was thrown away. Unknown what kind of tube was placed.    Nursing home RN contacted again. Reviewed hx and KUB results. Pt has hx of constipation and chronic ileus. BM record reviewed and has been WNL. Reviewed constipation tx plan.    Assessments & Plan (with Medical Decision Making)   Gastrostomy tube accidentally pulled out w/o complication this am. Replaced with a BALAJI KEY 18 Frisian gastrostomy tube in the ED w/o complication. Post-procedure abd films show good gastrostomy tube placement and significant stool burden w/o evidence of obstruction. Pt has hx of constipation and chronic ileus. BM  record reviewed with the Nursing home staff and has been WNL. Reviewed constipation tx plan with them.  D/c with instructions for supportive care and will return as needed for worsened condition or worsening symptoms, or new problems or concerns.      I have reviewed the nursing notes.    I have reviewed the findings, diagnosis, plan and need for follow up with the patient.    Discharge Medication List as of 8/24/2017  5:47 PM          Final diagnoses:   Dislodged gastrostomy tube (H) - Replaced with an BALAJI 18 French, 3 inch gastrostomy tube   Paralytic ileus (H) - chronic   Slow transit constipation       8/24/2017   [facility] EMERGENCY DEPARTMENT     Zachary Herrera MD  08/30/17 [id number]

## 2017-08-24 NOTE — ED NOTES
Per EMS patient does not talk or communicate secondary to dementia  No bleeding around g-tube site

## 2017-08-24 NOTE — ED AVS SNAPSHOT
[facility] Emergency Department    [address] [address]    Phone:  [phone]    Fax:  [phone]                                       Arlin Gonzalez   MRN: [id number]    Department:  [facility] Emergency Department   Date of Visit:  8/24/2017           After Visit Summary Signature Page     I have received my discharge instructions, and my questions have been answered. I have discussed any challenges I see with this plan with the nurse or doctor.    ..........................................................................................................................................  Patient/Patient Representative Signature      ..........................................................................................................................................  Patient Representative Print Name and Relationship to Patient    ..................................................               ................................................  Date                                            Time    ..........................................................................................................................................  Reviewed by Signature/Title    ...................................................              ..............................................  Date                                                            Time

## 2017-09-03 NOTE — TELEPHONE ENCOUNTER
Patient tube fed, does not take food orally. Today tube occluded after AM meds given.    PLAN  Staff to contact pharmacy and see if Clog Zapper available - use per directions if so  If not, ask if Viokace available and mix along with sodium bicarbonate 324 mg tab in 5 ml water - push into tube, allow to sit then flush with 30 cc of water  If this fails contact IR to see if tube replacement possible.   Hold meds, feeding until patent then resume    Electronically signed by LYLE Gaitan, GNP

## 2017-09-04 NOTE — TELEPHONE ENCOUNTER
Patient with feeding tube and today nurse reports brown drainage at GT site and also brown oral drainage. Patient usually spits up brown drainage orally but today this is worsened per nurse. She is able to give flushes and meds via tube but feels that some fluid (not the feeding) leaks out at insertion site when tube is used. She is not sure if patient has had a stool and doesn't know how to check bowel status report. VSS. No evidence of rectal bleed.     PLAN  Asked staff to check tube for placement - per nurse it appears to be in place when she listens while pushing in air.   Asked staff to hold off on flushes and feedings until able to check abdominal x-ray to see if any issues with obstruction since patient is having increased tube site and oral secretions.   Staff to get xray done asap and call provider on call with results. Did let them know I am on until five and that [facility] answering service number needs to be called with results.     Electronically signed by LYLE Gaitan, GNP

## 2017-09-05 NOTE — PROGRESS NOTES
"[facility]    Chief Complaint   Patient presents with     Nursing Home Acute       HPI:    Arlin Gonzalez is a 72 year old  (1945), who is being seen today for an episodic care visit at [facility].    HPI information obtained from: facility chart records, facility staff, patient report and family/first contact  report. Today's concern is:  Wernicke encephalopathy  Yesterday patient had increased emesis, brown in color, brown colored drainage around G-tube insertion site.  Abdominal x-ray did not show any abnormalities, no obstructions, gastrostomy tube over the gastric region.  CNA states that patient stools have been darker in color. Tube feedings were stopped pending x-ray results.  Patient now on 4lpm O2 via NC, minimally responsive to voice or touch.  - HOSPICE REFERRAL    REVIEW OF SYSTEMS:  Unobtainable secondary to cognitive impairment or aphasia.    /65  Pulse 127  Temp 98  F (36.7  C)  Resp 30  LMP 01/01/1982  SpO2 (!) 85%  GENERAL APPEARANCE:  Minimally responsive to voice or touch of staff or .     RESP:  apparent agonal breathing-- resp 30/min, labored breathing with myoclonus, on O2 4lpm.  Lungs with scattered rhonchi and wheezing  CV:  Palpation and auscultation of heart done , rate and rhythm rapid at 127,   ABDOMEN:  hypoactive bowel sounds, soft, nontender,   M/S:   Bed ridden, contractures    ASSESSMENT/PLAN:  Wernicke encephalopathy  End of life vs aspiration pneumonia.   indicates desire to treat pneumonia with PO antibiotics only.  No further evaluation, testing, hardship for patient.  tearfully states to \"let her go peacefully\".  OK with hospice referral  - HOSPICE REFERRAL     Orders:  1.  Chest X-ray AP and LAT to rule out aspiration pneumonia.  2.  Morphine Sulfate 20mg/5ml--10mg/2.5ml per Gtube every 4hrs PRN for pain, breathing difficulty.    3.  Resume flushing Gtube with water 175cc 5x/day.  4.  Resume tube " feedings.  Consult with dietary re:  Feeding options  5.  Hospice consult--[facility].      Total time spent with patient visit at the skilled nursing facility was 35 min including patient visit and visit with , hospice referral. Greater than 50% of total time spent with counseling and coordinating care due to end of life discussion    LYLE Hammer CNP